# Patient Record
Sex: FEMALE | Race: OTHER | Employment: UNEMPLOYED | URBAN - METROPOLITAN AREA
[De-identification: names, ages, dates, MRNs, and addresses within clinical notes are randomized per-mention and may not be internally consistent; named-entity substitution may affect disease eponyms.]

---

## 2018-10-04 PROBLEM — N94.6 DYSMENORRHEA: Status: ACTIVE | Noted: 2018-10-04

## 2018-10-04 PROBLEM — R39.89 BLADDER PAIN: Status: ACTIVE | Noted: 2018-10-04

## 2018-10-04 PROBLEM — N39.490 OVERFLOW STRESS URINARY INCONTINENCE IN FEMALE: Status: ACTIVE | Noted: 2018-10-04

## 2018-10-04 PROBLEM — N39.3 OVERFLOW STRESS URINARY INCONTINENCE IN FEMALE: Status: ACTIVE | Noted: 2018-10-04

## 2018-10-04 PROBLEM — N92.1 MENORRHAGIA WITH IRREGULAR CYCLE: Status: ACTIVE | Noted: 2018-10-04

## 2018-10-04 PROBLEM — N94.10 DYSPAREUNIA IN FEMALE: Status: ACTIVE | Noted: 2018-10-04

## 2019-02-25 PROBLEM — N94.89 HIGH-TONE PELVIC FLOOR DYSFUNCTION: Status: ACTIVE | Noted: 2018-10-04

## 2019-02-25 PROBLEM — R35.0 URINARY FREQUENCY: Status: ACTIVE | Noted: 2019-02-25

## 2019-03-25 ENCOUNTER — HOSPITAL ENCOUNTER (OUTPATIENT)
Dept: PHYSICAL THERAPY | Age: 34
Discharge: HOME | End: 2019-03-25
Attending: OBSTETRICS & GYNECOLOGY
Payer: COMMERCIAL

## 2019-03-25 DIAGNOSIS — R35.0 URINARY FREQUENCY: ICD-10-CM

## 2019-03-25 DIAGNOSIS — R39.89 BLADDER PAIN: ICD-10-CM

## 2019-03-25 DIAGNOSIS — N94.89 HIGH-TONE PELVIC FLOOR DYSFUNCTION: ICD-10-CM

## 2019-03-25 PROCEDURE — 97161 PT EVAL LOW COMPLEX 20 MIN: CPT

## 2019-03-25 PROCEDURE — 97110 THERAPEUTIC EXERCISES: CPT

## 2019-03-25 NOTE — PROGRESS NOTES
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 Required minimal visual, verbal and manual cues to promote proper body alignment, promote proper body posture and promote proper body mechanics. Progressed resistance, range and repetitions as indicated. Date:  3-25-19 Date:   Date:     Activity/Exercise Parameters Parameters Parameters   Patient Education Discussed HEP (bladder health, drops, diaphragmatic breathing) and POC                                            Pt gives verbal consent to internal  assessment/treatment no chaperon present. [employer] Portal     Treatment/Session Summary:  Pt reports good understanding of plan of care, as well as prescribed home exercise program.  All questions were answered to pt's satisfaction. Pt was invited to call with any further questions or concerns. · Response to Treatment:  see evaluation. · Communication/Consultation:  None today  · Equipment provided today:  None today  · Recommendations/Intent for next treatment session: Next visit will focus on relaxation, manual therapy.   Treatment Plan of Care Effective Dates:  3-25-19 to 19  Total Treatment Billable Duration:  10 minutes     Salvatore Prince DPT    Future Appointments   Date Time Provider Jasen Christelle   3/25/2019  1:00 PM MAGDALENA Levin [facility]   4/8/2019  8:30 AM MAGDALENA Levin [facility]   4/15/2019  8:30 AM MAGDALENA Levin [facility]   6/5/2019  3:30 PM MARILEE Moeller SSA PGU50 PGU

## 2019-03-25 NOTE — THERAPY EVALUATION
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 5 = High Risk)  MD Orders: Evaluate and Treat MEDICAL/REFERRING DIAGNOSIS:  Urinary frequency [R35.0]  Bladder pain [R39.89]  High-tone pelvic floor dysfunction [N94.89]   DATE OF ONSET: 2-3 years  REFERRING PHYSICIAN: Aryan Prater DO  RETURN PHYSICIAN APPOINTMENT: -     INITIAL ASSESSMENT:  Ms. Steffany Irving presents to physical therapy with painful intercourse, urinary incontinence, and pressure in her bladder. Patient demonstrates increased tone and tenderness to pelvic floor, decreased pelvic floor muscular ROM and strength, decreased coordination of pelvic floor. Patient also has poor bladder habits that are contributing to her symptoms. Patient wouldn't benefit from skilled physical therapy to address her deficits and maximize her function. PROBLEM LIST (Impacting functional limitations):  1. Decreased Strength  2. Decreased Balance  3. Decreased Flexibility/Joint Mobility  4. Decreased Murray with Home Exercise Program  5. Decreased coordination INTERVENTIONS PLANNED:  1. Family Education  2. Home Exercise Program (HEP)  3. Manual Therapy  4. Neuromuscular Re-education/Strengthening  5. Range of Motion (ROM)  6. Therapeutic Activites  7. Therapeutic Exercise/Strengthening   TREATMENT PLAN:  Effective Dates: 3/25/2019 TO 2019 (90 days).   Frequency/Duration: 1 time a week for 90 Days  GOALS: (Goals have been discussed and agreed upon with patient.)  1. Patient will verbalize an understanding of pelvic anatomy and causes of incontinence   2. Patient will demonstrate I with basic PFM HEP to improve awareness, coordination, and timing of PFM   3. Patient will demonstrate decreased accessory muscle activity during PFM contraction  4. Pt with demonstrate normal voluntary relaxation of the pelvic floor muscle group to improve pelvic floor ROM and decrease pain. 5. Pt will independently perform proper toilet position to improve bowel emptying and decrease pain with bowel movements. 6. Pt will decrease pad usage to 1/day  7. Pt will increase fluid intake by 2 glasses per day          Outcome Measure:   Pelvic Floor Impact Questionnaire (PFIQ-7)  Score (out of 300) Initial:   Bladder/urinary: 28.57  Bowel/rectum: 0  Vagina/pelvis: 28.57  Total: 51 Most Recent:      Interpretation of Score: This survey asks questions concerning certain bowel, bladder, or pelvic symptoms and how much these symptoms interfere with daily activities. Each section is scored on a 0-3 scale, 3 representing the greatest disability. The scores of each section (out of 100) are added together for a total score out of 300. Medical Necessity:   · Patient demonstrates good rehab potential due to higher previous functional level. Reason for Services/Other Comments:  · Patient continues to require skilled intervention due to urinary leakage and painful intercourse. Total Treatment Duration:   PT Patient Time In/Time Out  Time In: 1300  Time Out: 151 [facility], [facility]    Rehabilitation Potential For Stated Goals: Good  Regarding Lula Amsler therapy, I certify that the treatment plan above will be carried out by a therapist or under their direction.   Thank you for this referral,  Diego Feliz DPT     Referring Physician Signature: Johnathan Scales DO              Date                  The information in this section was collected on 3-25-19 (except where otherwise noted). HISTORY:   History of Present Injury/Illness (Reason for Referral):  Patient reports she is supposed to do therapy before getting a hysterectomy. She has painful periods and very delayed periods. She got her tubes tied after her last child. Sex has always been painful. Has had 3 vaginal deliveries. Bladder does not empty. She bloats really bad. Has a history of kidney stones. Leakage with coughing, sneezing. Feels like when she is about to climax she will pee on herself. Has a cyst on her uterus. Has been to OB-GYN, urologist, and URO-GYN. Reports when she sits on the toilet she feels like there is a ball of meat. Mom has a history of cervical cancer. Is unable to wear tampons b/c it pushes itself out. Urinary: Frequency 3-4 x/day, 2 x/night. Positive for  mixed urinary incontinence (SHON), urgency, incomplete emptying. Pt uses not use pads for protects; 2 pads per day (PPD). Denies  mixed urinary incontinence (SHON), frequency,  urinary hesitancy, dysuria, hematuria. Fluid intake: 0 oz water/day; bladder irritants include: mountain dew, tea, coffee  Bowel: Frequency 1 every day. Denies pain with bowel movement (BM), pushing/straining with BM, incomplete emptying, fecal incontinence, constipation. Sexual: Pt is sexually active. Male partners. Positive for dyspareunia. .  Pelvic Organ Prolapse/Pelvic Pain: none. Past Medical History/Comorbidities:   Ms. Goran Watson  has a past medical history of Anxiety, Depression, Depression, Kidney stone, Neuropathy, Other ill-defined conditions(799.89), Restless leg syndrome, and Restless leg syndrome.  She also has no past medical history of Arthritis, Asthma, Autoimmune disease ([address] CAD (coronary artery disease), Cancer ([address] Chronic kidney disease, COPD, Dementia, Diabetes ([address] Endocrine disease, Gastrointestinal disorder, Heart failure ([address] Hypertension, Infectious disease, Liver disease, Neurological disorder, PUD (peptic ulcer disease), Seizures [facility]), Stroke ([facility] or Thromboembolus ([facility] Ms. Ezequiel So  has a past surgical history that includes hx urological; pr lap,tubal cautery; fragment kidney stone/ eswl; and hx tubal ligation (2015). Social History/Living Environment:    Live with  and children  Have you ever had any pelvic trauma (orthopedic in nature, fall, MVA, etc.)? no  Have you ever experienced any unwanted physical or sexual contact? no  Have you ever experienced any form of medical trauma (GYN, urological, GI, etc)? no    Prior Level of Function/Work/Activity:  Not working. Does not exercise     Dominant Side:         RIGHT    Personal Factors:          Sex:  female        Age:  29 y.o. Date: 3-25-19   Ambulatory/Rehab Services H2 Model Falls Risk Assessment    Risk Factors:       No Risk Factors Identified Ability to Rise from Chair:       (0)  Ability to rise in a single movement    Falls Prevention Plan:       No modifications necessary   Total: (5 or greater = High Risk): 0    ©2010 [facility] [employer]. All Rights Reserved. [facility] Patent #[id number]. Federal Law prohibits the replication, distribution or use without written permission from [facility] [employer]     Current Medications:       Current Outpatient Medications:     venlafaxine-SR (EFFEXOR-XR) 37.5 mg capsule, Take 1 Cap by mouth daily. , Disp: 30 Cap, Rfl: 0    trazodone HCl (TRAZODONE PO), Take  by mouth., Disp: , Rfl:     clonazePAM (KLONOPIN) 0.5 mg tablet, Take  by mouth nightly as needed. , Disp: , Rfl:     divalproex DR (DEPAKOTE) 500 mg tablet, Take  by mouth three (3) times daily. , Disp: , Rfl:     gabapentin (NEURONTIN) 300 mg capsule, Take 300 mg by mouth three (3) times daily. , Disp: , Rfl:     cyclobenzaprine (FLEXERIL) 10 mg tablet, Take  by mouth three (3) times daily as needed for Muscle Spasm(s). , Disp: , Rfl:    Date Last Reviewed:  3/25/2019   Number of Personal Factors/Comorbidities that affect the Plan of Care: 0: LOW COMPLEXITY EXAMINATION:     Observation/Orthostatic Postural Assessment:          Normal external genitalia. Decreased ability to wink and bulge. Increased accessory muscle use  Palpation:          Increased tightness and tone to pelvic floor. Painful  ROM:          Decreased due to high tone  Strength:          P: Power, E: Endurance, R: Repetitions, QF: Quick Flicks, TrA: Transverse Abdominus, DB: Diaphragmatic Breathing  P 2   E 1   R 1   QF    TrA    DB      Special Tests:          Prolapse: anterior wall stage 1   Neurological Screen:        Myotomes:  intact        Dermatomes:  intact     Body Structures Involved:  1. Muscles Body Functions Affected:  1. Sensory/Pain  2. Neuromusculoskeletal  3. Movement Related Activities and Participation Affected:  1. General Tasks and Demands  2. Self Care  3. Domestic Life  4. Interpersonal Interactions and Relationships  5.  Community, Social and Wayne Ruth   Number of elements (examined above) that affect the Plan of Care: 3: MODERATE COMPLEXITY   CLINICAL PRESENTATION:   Presentation: Evolving clinical presentation with changing clinical characteristics: MODERATE COMPLEXITY   CLINICAL DECISION MAKING:      Use of outcome tool(s) and clinical judgement create a POC that gives a: Questionable prediction of patient's progress: MODERATE COMPLEXITY

## 2019-04-07 NOTE — PROGRESS NOTES
Shanice Guevara  : 1985  Primary: 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 She noticed when she started the exercises she got stabbing pains in the ovaries. Continues to get urinary leakage with sneezing. Has added in 1 bottle of water. Pain: Initial:   0 Post Session:  0/10   Medications Last Reviewed:  2019   Updated Objective Findings:  see below   Observation/Orthostatic Postural Assessment:          Normal external genitalia. Decreased ability to wink and bulge. Increased accessory muscle use  Palpation:          Increased tightness and tone to pelvic floor. Painful  ROM:          Decreased due to high tone  Strength:          P: Power, E: Endurance, R: Repetitions, QF: Quick Flicks, TrA: Transverse Abdominus, DB: Diaphragmatic Breathing  P 2   E 1   R 1   QF     TrA     DB            TREATMENT:   THERAPEUTIC EXERCISE: (15  minutes):  Exercises per grid below to improve mobility, strength and coordination. Required minimal visual, verbal and manual cues to promote proper body alignment, promote proper body posture and promote proper body mechanics. Progressed resistance, range and repetitions as indicated.    Date:  3-25-19 Date:  19 Date:     Activity/Exercise Parameters Parameters Parameters   Patient Education Discussed HEP (bladder health, drops, diaphragmatic breathing) and POC     SKTC    30 sec hold x 3    Butterfly     30 sec hold x 3    Figure 4    30 sec hold x 3    Toilet Mechanics        The Knack                 Pt gives verbal consent to internal  assessment/treatment no chaperon present. MANUAL THERAPY: (45 minutes) to improve soft tissue mobility and tone  Date Type Location Comments   4/8/2019 Internal assessment/treatment Via vaginal canal Sustained pressure, connective tissue mobilizations and trigger point release to all layers                                          (Used abbreviations: MET - muscle energy technique; SCS- Strain counter strain; CTM-Connective tissue mobilizations; CR- Contract/relax; SP- Sustained pressure, TrP-Trigger point release, IASTM- Instrument assisted soft tissue mobilizations, TDN-Trigger point dry needling)    [facility] Portal     Treatment/Session Summary:  Pt reports good understanding of plan of care, as well as prescribed home exercise program.  All questions were answered to pt's satisfaction. Pt was invited to call with any further questions or concerns. · Response to Treatment:  Patient continues to have increased tone in pelvic floor but less painful today. Has not been as compliant with her HEP. Continues to have poor diet. · Communication/Consultation:  None today  · Equipment provided today:  None today  · Recommendations/Intent for next treatment session: Next visit will focus on dilators, manual therapy.   Treatment Plan of Care Effective Dates:  3-25-19 to 6-25-19  Total Treatment Billable Duration:  45 minutes manual, 10 minutes there ex     Juan José Clayton DPT    Future Appointments   Date Time Provider Jasen Bourgeois   4/8/2019  8:30 AM Clay Barrier, DPT [facility]   4/15/2019  8:30 AM Marcheta Barrier, DPT [facility]   4/22/2019  8:30 AM Marcheta Barrier, DPT [facility]   4/29/2019  8:30 AM Marcheta Barrier, DPT [facility]   5/6/2019  9:00 AM Marcheta Barrier, DPT [facility]   5/13/2019  9:00 AM Marcheta Barrier, DPT [facility]   6/5/2019  3:30 PM MARILEE Ortiz SSA PGU50 PGU

## 2019-04-08 ENCOUNTER — HOSPITAL ENCOUNTER (OUTPATIENT)
Dept: PHYSICAL THERAPY | Age: 34
Discharge: HOME | End: 2019-04-08
Attending: OBSTETRICS & GYNECOLOGY
Payer: COMMERCIAL

## 2019-04-08 PROCEDURE — 97140 MANUAL THERAPY 1/> REGIONS: CPT

## 2019-04-08 PROCEDURE — 97110 THERAPEUTIC EXERCISES: CPT

## 2019-04-11 NOTE — PROGRESS NOTES
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 Her ovaries \"kept going crazy. \" She is 1 week late for her period. Does not have as much leakage. Pain: Initial:   0 Post Session:  0/10   Medications Last Reviewed:  4/15/2019   Updated Objective Findings:  see below   Observation/Orthostatic Postural Assessment:          Normal external genitalia. Decreased ability to wink and bulge. Increased accessory muscle use  Palpation:          Increased tightness and tone to pelvic floor. Painful  ROM:          Decreased due to high tone  Strength:          P: Power, E: Endurance, R: Repetitions, QF: Quick Flicks, TrA: Transverse Abdominus, DB: Diaphragmatic Breathing  P 2   E 1   R 1   QF     TrA     DB            TREATMENT:   THERAPEUTIC EXERCISE: (15  minutes):  Exercises per grid below to improve mobility, strength and coordination. Required minimal visual, verbal and manual cues to promote proper body alignment, promote proper body posture and promote proper body mechanics. Progressed resistance, range and repetitions as indicated.    Date:  3-25-19 Date:  19 Date:  4-15-19   Activity/Exercise Parameters Parameters Parameters   Patient Education Discussed HEP (bladder health, drops, diaphragmatic breathing) and POC     SKTC    30 sec hold x 3 30 sec hold x 3   Butterfly     30 sec hold x 3 30 sec hold x 3   Figure 4    30 sec hold x 3 30 sec hold x 3   Toilet Mechanics The Alvaro     Reviewed and discussed             Pt gives verbal consent to internal  assessment/treatment no chaperon present. MANUAL THERAPY: (45 minutes) to improve soft tissue mobility and tone  Date Type Location Comments   4/15/2019 Internal assessment/treatment Via vaginal canal Sustained pressure, connective tissue mobilizations and trigger point release to all layers                                          (Used abbreviations: MET - muscle energy technique; SCS- Strain counter strain; CTM-Connective tissue mobilizations; CR- Contract/relax; SP- Sustained pressure, TrP-Trigger point release, IASTM- Instrument assisted soft tissue mobilizations, TDN-Trigger point dry needling)    [employer] Portal     Treatment/Session Summary:  Pt reports good understanding of plan of care, as well as prescribed home exercise program.  All questions were answered to pt's satisfaction. Pt was invited to call with any further questions or concerns. · Response to Treatment:  Patient with slightly less tone in muscles today. Did not report pain. Continues to follow poor diet. Reviewed the alvaro. · Communication/Consultation:  None today  · Equipment provided today:  None today  · Recommendations/Intent for next treatment session: Next visit will focus on dilators, manual therapy.   Treatment Plan of Care Effective Dates:  3-25-19 to 6-25-19  Total Treatment Billable Duration:  45 minutes manual, 10 minutes there ex  PT Patient Time In/Time Out  Time In: 0830  Time Out: [address], DPT    Future Appointments   Date Time Provider Jasen Bourgeois   4/22/2019  8:30 AM MAGDALENA Tse [facility]   4/29/2019  8:30 AM MAGDALENA Tse [facility]   5/6/2019  9:00 AM MAG[facility]ALENA [facility]   5/13/2019  9:00 AM Clay Ramos DPT [facility]   6/5/2019  3:30 PM MARILEE Ortiz SSA PGU50 PGU

## 2019-04-15 ENCOUNTER — HOSPITAL ENCOUNTER (OUTPATIENT)
Dept: PHYSICAL THERAPY | Age: 34
Discharge: HOME | End: 2019-04-15
Attending: OBSTETRICS & GYNECOLOGY
Payer: COMMERCIAL

## 2019-04-15 PROCEDURE — 97110 THERAPEUTIC EXERCISES: CPT

## 2019-04-15 PROCEDURE — 97140 MANUAL THERAPY 1/> REGIONS: CPT

## 2019-04-29 ENCOUNTER — HOSPITAL ENCOUNTER (OUTPATIENT)
Dept: PHYSICAL THERAPY | Age: 34
Discharge: HOME | End: 2019-04-29
Attending: OBSTETRICS & GYNECOLOGY
Payer: COMMERCIAL

## 2019-04-29 PROCEDURE — 97140 MANUAL THERAPY 1/> REGIONS: CPT

## 2019-04-29 PROCEDURE — 97110 THERAPEUTIC EXERCISES: CPT

## 2019-04-29 NOTE — PROGRESS NOTES
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 Reports she finally got her period. Had intercourse last night and it wasn't painful. Leakage getting better. Still has urgency. Pain: Initial:   0 Post Session:  0/10   Medications Last Reviewed:  2019   Updated Objective Findings:  see below   Observation/Orthostatic Postural Assessment:          Normal external genitalia. Decreased ability to wink and bulge. Increased accessory muscle use  Palpation:          Increased tightness and tone to pelvic floor. Painful  ROM:          Decreased due to high tone  Strength:          P: Power, E: Endurance, R: Repetitions, QF: Quick Flicks, TrA: Transverse Abdominus, DB: Diaphragmatic Breathing  P 2   E 1   R 1   QF     TrA     DB         Urinary: Frequency 3-4 x/day, 2 x/night. Positive for  mixed urinary incontinence (SHON), urgency, incomplete emptying. Pt uses not use pads for protects; 2 pads per day (PPD). Denies  mixed urinary incontinence (SHON), frequency,  urinary hesitancy, dysuria, hematuria. Fluid intake: 0 oz water/day; bladder irritants include: mountain dew, tea, coffee  Bowel: Frequency 1 every day. Denies pain with bowel movement (BM), pushing/straining with BM, incomplete emptying, fecal incontinence, constipation. Sexual: Pt is sexually active. Male partners. Positive for dyspareunia. .  Pelvic Organ Prolapse/Pelvic Pain: none.      TREATMENT: THERAPEUTIC EXERCISE: (15  minutes):  Exercises per grid below to improve mobility, strength and coordination. Required minimal visual, verbal and manual cues to promote proper body alignment, promote proper body posture and promote proper body mechanics. Progressed resistance, range and repetitions as indicated. Date:  3-25-19 Date:  4-8-19 Date:  4-15-19 Date:  4-29-19   Activity/Exercise Parameters Parameters Parameters    Patient Education Discussed HEP (bladder health, drops, diaphragmatic breathing) and POC      SKTC    30 sec hold x 3 30 sec hold x 3 30 sec hold x 3   Butterfly     30 sec hold x 3 30 sec hold x 3 30 sec hold x 3   Figure 4    30 sec hold x 3 30 sec hold x 3 30 sec hold x 3   Toilet Mechanics         The Knack     Reviewed and discussed  Urge suppression             Pt gives verbal consent to internal  assessment/treatment no chaperon present. MANUAL THERAPY: (45 minutes) to improve soft tissue mobility and tone  Date Type Location Comments   4/29/2019 Internal assessment/treatment Via vaginal canal Sustained pressure, connective tissue mobilizations and trigger point release to all layers                                          (Used abbreviations: MET - muscle energy technique; SCS- Strain counter strain; CTM-Connective tissue mobilizations; CR- Contract/relax; SP- Sustained pressure, TrP-Trigger point release, IASTM- Instrument assisted soft tissue mobilizations, TDN-Trigger point dry needling)    [facility] Portal     Treatment/Session Summary:  Pt reports good understanding of plan of care, as well as prescribed home exercise program.  All questions were answered to pt's satisfaction. Pt was invited to call with any further questions or concerns. · Response to Treatment:  Patient has demonstrated improvements in symptoms. Will continue to progress as tolerated.      · Communication/Consultation:  None today  · Equipment provided today:  None today  · Recommendations/Intent for next treatment session: Next visit will focus on manual therapy, exercises  Treatment Plan of Care Effective Dates:  3-25-19 to 6-25-19  Total Treatment Billable Duration:  45 minutes manual, 10 minutes there ex  PT Patient Time In/Time Out  Time In: 0830  Time Out: [address], DPT    Future Appointments   Date Time Provider Jasen Bourgeois   5/6/2019  9:00 AM MAGDALENA Tse [facility]   5/13/2019  9:00 AM MAGDALENA Tse [facility]   6/5/2019  3:30 PM MARILEE Ortiz SSA PGU50 PGU

## 2019-05-02 NOTE — PROGRESS NOTES
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 She had sex 3 days in a row and had tightening from her ribs down to her pelvis. It doesn't hurt during sex but it hurts after. Patient continues to have urgency. Pain: Initial:   0 Post Session:  0/10   Medications Last Reviewed:  2019   Updated Objective Findings:  see below   Observation/Orthostatic Postural Assessment:          Normal external genitalia. Decreased ability to wink and bulge. Increased accessory muscle use  Palpation:          Increased tightness and tone to pelvic floor. Painful  ROM:          Decreased due to high tone  Strength:          P: Power, E: Endurance, R: Repetitions, QF: Quick Flicks, TrA: Transverse Abdominus, DB: Diaphragmatic Breathing  P 2   E 1   R 1   QF     TrA     DB         Urinary: Frequency 3-4 x/day, 2 x/night. Positive for  mixed urinary incontinence (SHON), urgency, incomplete emptying. Pt uses not use pads for protects; 2 pads per day (PPD). Denies  mixed urinary incontinence (SHON), frequency,  urinary hesitancy, dysuria, hematuria. Fluid intake: 0 oz water/day; bladder irritants include: mountain dew, tea, coffee  Bowel: Frequency 1 every day. Denies pain with bowel movement (BM), pushing/straining with BM, incomplete emptying, fecal incontinence, constipation. Sexual: Pt is sexually active. Male partners. Positive for dyspareunia. .  Pelvic Organ Prolapse/Pelvic Pain: none.      TREATMENT: THERAPEUTIC EXERCISE: (30  minutes):  Exercises per grid below to improve mobility, strength and coordination. Required minimal visual, verbal and manual cues to promote proper body alignment, promote proper body posture and promote proper body mechanics. Progressed resistance, range and repetitions as indicated. Date:  3-25-19 Date:  4-8-19 Date:  4-15-19 Date:  4-29-19 Date:  5-6-19   Activity/Exercise Parameters Parameters Parameters     Patient Education Discussed HEP (bladder health, drops, diaphragmatic breathing) and POC       SKTC    30 sec hold x 3 30 sec hold x 3 30 sec hold x 3 30 sec hold x 3   Butterfly     30 sec hold x 3 30 sec hold x 3 30 sec hold x 3 30 sec hold x 3   Figure 4    30 sec hold x 3 30 sec hold x 3 30 sec hold x 3 30 sec hold x 3   Toilet Mechanics          The Knack     Reviewed and discussed  Urge suppression    Bridge     x15     Clamshells     x15     Quadruped with leg extension     x15 B        Pt gives verbal consent to internal  assessment/treatment no chaperon present. MANUAL THERAPY: (30 minutes) to improve soft tissue mobility and tone  Date Type Location Comments   5/6/2019 Internal assessment/treatment Via vaginal canal Sustained pressure, connective tissue mobilizations and trigger point release to all layers                                          (Used abbreviations: MET - muscle energy technique; SCS- Strain counter strain; CTM-Connective tissue mobilizations; CR- Contract/relax; SP- Sustained pressure, TrP-Trigger point release, IASTM- Instrument assisted soft tissue mobilizations, TDN-Trigger point dry needling)    [facility] Portal     Treatment/Session Summary:  Pt reports good understanding of plan of care, as well as prescribed home exercise program.  All questions were answered to pt's satisfaction. Pt was invited to call with any further questions or concerns. · Response to Treatment:  Patient with less tightness in pelvic floor.  Will see how there ex was tolerated.       · Communication/Consultation:  None today  · Equipment provided today:  None today  · Recommendations/Intent for next treatment session: Next visit will focus on manual therapy, exercises  Treatment Plan of Care Effective Dates:  3-25-19 to 6-25-19  Total Treatment Billable Duration:  30 minutes manual, 25 minutes there ex  PT Patient Time In/Time Out  Time In: 0900  Time Out: Wilma Lorenzana 42, DPT    Future Appointments   Date Time Provider Jasen Bourgeois   5/13/2019  9:00 AM MAGDALENA De La [facility]   5/28/2019  9:00 AM MAGDALENA De La Rosa [facility]   6/4/2019  9:00 AM MAGDALENA De La Rosa [facility]   6/5/2019  3:30 PM MARILEE Andrews SSA [id number] PGU

## 2019-05-06 ENCOUNTER — HOSPITAL ENCOUNTER (OUTPATIENT)
Dept: PHYSICAL THERAPY | Age: 34
Discharge: HOME | End: 2019-05-06
Attending: OBSTETRICS & GYNECOLOGY
Payer: COMMERCIAL

## 2019-05-06 PROCEDURE — 97110 THERAPEUTIC EXERCISES: CPT

## 2019-05-06 PROCEDURE — 97140 MANUAL THERAPY 1/> REGIONS: CPT

## 2019-05-13 ENCOUNTER — HOSPITAL ENCOUNTER (OUTPATIENT)
Dept: PHYSICAL THERAPY | Age: 34
Discharge: HOME | End: 2019-05-13
Attending: OBSTETRICS & GYNECOLOGY
Payer: COMMERCIAL

## 2019-05-13 PROCEDURE — 97110 THERAPEUTIC EXERCISES: CPT

## 2019-05-13 PROCEDURE — 97140 MANUAL THERAPY 1/> REGIONS: CPT

## 2019-05-13 NOTE — PROGRESS NOTES
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 5 = High Risk)  MD Orders: Evaluate and Treat MEDICAL/REFERRING DIAGNOSIS:  Urinary frequency [R35.0]  Bladder pain [R39.89]  High-tone pelvic floor dysfunction [N94.89]   DATE OF ONSET: 2-3 years  REFERRING PHYSICIAN: Ania Topete DO RETURN PHYSICIAN APPOINTMENT: -         Pre-treatment Symptoms/Complaints:  Patient reports she tried to have sex the next day after therapy and it hurt and didn't work. Patient reports the exercises have increased her restless legs and she hasn't been sleeping at night. Reports that she thought things were getting better but its worse. Reports she is nervous for the urodynamics test.    Pain: Initial:   0 Post Session:  0/10   Medications Last Reviewed:  2019   Updated Objective Findings:  see below   Observation/Orthostatic Postural Assessment:          Normal external genitalia. Decreased ability to wink and bulge. Increased accessory muscle use  Palpation:          Increased tightness and tone to pelvic floor. Painful  ROM:          Decreased due to high tone  Strength:          P: Power, E: Endurance, R: Repetitions, QF: Quick Flicks, TrA: Transverse Abdominus, DB: Diaphragmatic Breathing  P 2   E 1   R 1   QF     TrA     DB         Urinary: Frequency 3-4 x/day, 2 x/night. Positive for  mixed urinary incontinence (SHON), urgency, incomplete emptying. Pt uses not use pads for protects; 2 pads per day (PPD).  Denies mixed urinary incontinence (SHON), frequency,  urinary hesitancy, dysuria, hematuria. Fluid intake: 0 oz water/day; bladder irritants include: mountain dew, tea, coffee  Bowel: Frequency 1 every day. Denies pain with bowel movement (BM), pushing/straining with BM, incomplete emptying, fecal incontinence, constipation. Sexual: Pt is sexually active. Male partners. Positive for dyspareunia. .  Pelvic Organ Prolapse/Pelvic Pain: none. TREATMENT:   THERAPEUTIC EXERCISE: (15  minutes):  Exercises per grid below to improve mobility, strength and coordination. Required minimal visual, verbal and manual cues to promote proper body alignment, promote proper body posture and promote proper body mechanics. Progressed resistance, range and repetitions as indicated. Date:  3-25-19 Date:  4-8-19 Date:  4-15-19 Date:  4-29-19 Date:  5-6-19 Date:  5-13-19   Activity/Exercise Parameters Parameters Parameters      Patient Education Discussed HEP (bladder health, drops, diaphragmatic breathing) and POC        SKTC    30 sec hold x 3 30 sec hold x 3 30 sec hold x 3 30 sec hold x 3 30 sec hold x 3   Butterfly     30 sec hold x 3 30 sec hold x 3 30 sec hold x 3 30 sec hold x 3 30 sec hold x 3   Figure 4    30 sec hold x 3 30 sec hold x 3 30 sec hold x 3 30 sec hold x 3 30 sec hold x 3   Toilet Mechanics           The Cynthia     Reviewed and discussed  Urge suppression     Bridge     x15      Clamshells     x15      Quadruped with leg extension     x15 B         Pt gives verbal consent to internal  assessment/treatment no chaperon present.      MANUAL THERAPY: (45 minutes) to improve soft tissue mobility and tone  Date Type Location Comments   5/13/2019 Internal assessment/treatment Via vaginal canal Sustained pressure, connective tissue mobilizations and trigger point release to all layers                                          (Used abbreviations: MET - muscle energy technique; SCS- Strain counter strain; CTM-Connective tissue mobilizations; CR- Contract/relax; SP- Sustained pressure, TrP-Trigger point release, IASTM- Instrument assisted soft tissue mobilizations, TDN-Trigger point dry needling)    [facility] Portal     Treatment/Session Summary:  Pt reports good understanding of plan of care, as well as prescribed home exercise program.  All questions were answered to pt's satisfaction. Pt was invited to call with any further questions or concerns. · Response to Treatment:  Patient's pelvic floor was like hitting a brick wall. It was super tight and it felt like her rectum was in the way. Post manual she was able to empty her bladder completely.      · Communication/Consultation:  None today  · Equipment provided today:  None today  · Recommendations/Intent for next treatment session: Next visit will focus on manual therapy, exercises  Treatment Plan of Care Effective Dates:  3-25-19 to 6-25-19  Total Treatment Billable Duration:  40 minutes manual, 15 minutes there ex  PT Patient Time In/Time Out  Time In: 0900  Time Out: Wilma Mcdaniels, MAGDALENA    Future Appointments   Date Time Provider Jasen Bourgeois   5/28/2019  9:00 AM MAGDALENA Fox [facility]   [facility]2019  [facility]   6/12/2019  3:30 PM MARILEE Hauser SSA [id number] PGU

## 2019-05-28 ENCOUNTER — HOSPITAL ENCOUNTER (OUTPATIENT)
Dept: PHYSICAL THERAPY | Age: 34
Discharge: HOME | End: 2019-05-28
Attending: OBSTETRICS & GYNECOLOGY
Payer: COMMERCIAL

## 2019-05-28 PROCEDURE — 97110 THERAPEUTIC EXERCISES: CPT

## 2019-05-28 PROCEDURE — 97140 MANUAL THERAPY 1/> REGIONS: CPT

## 2019-05-28 NOTE — PROGRESS NOTES
Keya Escalera  : 1985  Primary: [facility]  Secondary:  [facility] at [address] [place], [place] 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 5 = High Risk)  MD Orders: Evaluate and Treat MEDICAL/REFERRING DIAGNOSIS:  Urinary frequency [R35.0]  Bladder pain [R39.89]  High-tone pelvic floor dysfunction [N94.89]   DATE OF ONSET: 2-3 years  REFERRING PHYSICIAN: Tari Topete DO RETURN PHYSICIAN APPOINTMENT: -         Pre-treatment Symptoms/Complaints:  Patient reports she had sex and she bled for 3 days. She took a picture of her vagina and the one day it showed a bulge the other day it wasn't there. Reports she was swollen down there the next day. Reports she doesn't have the urge to urinate as much which is better. Pain: Initial:   0 Post Session:  0/10   Medications Last Reviewed:  2019   Updated Objective Findings:  see below   Observation/Orthostatic Postural Assessment:          Normal external genitalia. Decreased ability to wink and bulge. Increased accessory muscle use  Palpation:          Increased tightness and tone to pelvic floor. Painful  ROM:          Decreased due to high tone  Strength:          P: Power, E: Endurance, R: Repetitions, QF: Quick Flicks, TrA: Transverse Abdominus, DB: Diaphragmatic Breathing  P 2   E 1   R 1   QF     TrA     DB         Urinary: Frequency 3-4 x/day, 2 x/night. Positive for  mixed urinary incontinence (SHON), urgency, incomplete emptying. Pt uses not use pads for protects; 2 pads per day (PPD).  Denies  mixed urinary incontinence (SHON), frequency, urinary hesitancy, dysuria, hematuria. Fluid intake: 0 oz water/day; bladder irritants include: mountain dew, tea, coffee  Bowel: Frequency 1 every day. Denies pain with bowel movement (BM), pushing/straining with BM, incomplete emptying, fecal incontinence, constipation. Sexual: Pt is sexually active. Male partners. Positive for dyspareunia. .  Pelvic Organ Prolapse/Pelvic Pain: none. TREATMENT:   THERAPEUTIC EXERCISE: (15  minutes):  Exercises per grid below to improve mobility, strength and coordination. Required minimal visual, verbal and manual cues to promote proper body alignment, promote proper body posture and promote proper body mechanics. Progressed resistance, range and repetitions as indicated. Date:  3-25-19 Date:  4-8-19 Date:  4-15-19 Date:  4-29-19 Date:  5-6-19 Date:  5-13-19 Date:  5-28-19   Activity/Exercise Parameters Parameters Parameters       Patient Education Discussed HEP (bladder health, drops, diaphragmatic breathing) and POC         SKTC    30 sec hold x 3 30 sec hold x 3 30 sec hold x 3 30 sec hold x 3 30 sec hold x 3 30 sec hold x 3   Butterfly     30 sec hold x 3 30 sec hold x 3 30 sec hold x 3 30 sec hold x 3 30 sec hold x 3 30 sec hold x 3   Figure 4    30 sec hold x 3 30 sec hold x 3 30 sec hold x 3 30 sec hold x 3 30 sec hold x 3 30 sec hold x 3   Toilet Mechanics            The Knack     Reviewed and discussed  Urge suppression      Bridge     x15    x15   Clamshells     x15    x15   Quadruped with leg extension     x15 B    x15 B      Pt gives verbal consent to internal  assessment/treatment no chaperon present.      MANUAL THERAPY: (45 minutes) to improve soft tissue mobility and tone  Date Type Location Comments   5/28/2019 Internal assessment/treatment Via vaginal canal Sustained pressure, connective tissue mobilizations and trigger point release to all layers                                          (Used abbreviations: MET - muscle energy technique; SCS- Strain counter strain; CTM-Connective tissue mobilizations; CR- Contract/relax; SP- Sustained pressure, TrP-Trigger point release, IASTM- Instrument assisted soft tissue mobilizations, TDN-Trigger point dry needling)    [facility] Portal     Treatment/Session Summary:  Pt reports good understanding of plan of care, as well as prescribed home exercise program.  All questions were answered to pt's satisfaction. Pt was invited to call with any further questions or concerns. · Response to Treatment:  Patient showed me pictures of the prolapse at different times of the day. Today pelvic floor was still tight but not tender. Started back with exercises to help prevent prolapse.    · Communication/Consultation:  None today  · Equipment provided today:  None today  · Recommendations/Intent for next treatment session: Next visit will focus on manual therapy, exercises  Treatment Plan of Care Effective Dates:  3-25-19 to 6-25-19  Total Treatment Billable Duration:  40 minutes manual, 10 minutes there ex  PT Patient Time In/Time Out  Time In: 0900  Time Out: Wilma Mcdaniels, MAGDALENA    Future Appointments   Date Time Provider Jasen Bourgeois   6/4/2019  9:00 AM Merna Castañeda DPT [facility]   6/12/2019  3:30 PM MARILEE Delgado SSA PGU50 PGU

## 2019-06-04 ENCOUNTER — HOSPITAL ENCOUNTER (OUTPATIENT)
Dept: PHYSICAL THERAPY | Age: 34
Discharge: HOME | End: 2019-06-04
Attending: OBSTETRICS & GYNECOLOGY
Payer: COMMERCIAL

## 2019-06-04 PROCEDURE — 97140 MANUAL THERAPY 1/> REGIONS: CPT

## 2019-06-04 PROCEDURE — 97110 THERAPEUTIC EXERCISES: CPT

## 2019-06-04 NOTE — PROGRESS NOTES
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 5 = High Risk)  MD Orders: Evaluate and Treat MEDICAL/REFERRING DIAGNOSIS:  Urinary frequency [R35.0]  Bladder pain [R39.89]  High-tone pelvic floor dysfunction [N94.89]   DATE OF ONSET: 2-3 years  REFERRING PHYSICIAN: Charu Topete DO RETURN PHYSICIAN APPOINTMENT: -         Pre-treatment Symptoms/Complaints:  Patient reports she is starting the bladder diary for the urodynamics test. She has not had sex. Pain: Initial:   0 Post Session:  0/10   Medications Last Reviewed:  2019   Updated Objective Findings:  see below   Observation/Orthostatic Postural Assessment:          Normal external genitalia. Palpation:          Increased tightness and tone to pelvic floor. ROM:          Decreased due to high tone  Strength:          P: Power, E: Endurance, R: Repetitions, QF: Quick Flicks, TrA: Transverse Abdominus, DB: Diaphragmatic Breathing  P 2   E 1   R 1   QF     TrA     DB         Urinary: Frequency 3-4 x/day, 1 x/night. Positive for mixed urinary incontinence (SHON), urgency. Pt uses not use pads for protects; 0 pads per day (PPD). Denies  JUSTO, frequency,  urinary hesitancy, incomplete emptying, dysuria, hematuria. Fluid intake: 0 oz water/day; bladder irritants include: mountain dew, tea, coffee  Bowel: Frequency 1 every day. Denies pain with bowel movement (BM), pushing/straining with BM, incomplete emptying, fecal incontinence, constipation. Sexual: Pt is sexually active. Male partners. Positive for dyspareunia. .  Pelvic Organ Prolapse/Pelvic Pain: none. TREATMENT:   THERAPEUTIC EXERCISE: (15  minutes):  Exercises per grid below to improve mobility, strength and coordination. Required minimal visual, verbal and manual cues to promote proper body alignment, promote proper body posture and promote proper body mechanics. Progressed resistance, range and repetitions as indicated. Date:  3-25-19 Date:  4-8-19 Date:  4-15-19 Date:  4-29-19 Date:  5-6-19 Date:  5-13-19 Date:  5-28-19 Date:  6-4-19   Activity/Exercise Parameters Parameters Parameters        Patient Education Discussed HEP (bladder health, drops, diaphragmatic breathing) and POC          SKTC    30 sec hold x 3 30 sec hold x 3 30 sec hold x 3 30 sec hold x 3 30 sec hold x 3 30 sec hold x 3 30 sec hold x 3   Butterfly     30 sec hold x 3 30 sec hold x 3 30 sec hold x 3 30 sec hold x 3 30 sec hold x 3 30 sec hold x 3 30 sec hold x 3   Figure 4    30 sec hold x 3 30 sec hold x 3 30 sec hold x 3 30 sec hold x 3 30 sec hold x 3 30 sec hold x 3 30 sec hold x 3   Toilet Mechanics             The Knack     Reviewed and discussed  Urge suppression       Bridge     x15    x15    Clamshells     x15    x15    Quadruped with leg extension     x15 B    x15 B       Pt gives verbal consent to internal  assessment/treatment no chaperon present.      MANUAL THERAPY: (30 minutes) to improve soft tissue mobility and tone  Date Type Location Comments   6/4/2019 Internal assessment/treatment Via vaginal canal Sustained pressure, connective tissue mobilizations and trigger point release to all layers                                          (Used abbreviations: MET - muscle energy technique; SCS- Strain counter strain; CTM-Connective tissue mobilizations; CR- Contract/relax; SP- Sustained pressure, TrP-Trigger point release, IASTM- Instrument assisted soft tissue mobilizations, TDN-Trigger point dry needling)    [facility] Portal     Treatment/Session Summary:  Pt reports good understanding of plan of care, as well as prescribed home exercise program.  All questions were answered to pt's satisfaction. Pt was invited to call with any further questions or concerns.   Total Treatment Billable Duration:  30 minutes manual, 15 minutes there ex  PT Patient Time In/Time Out  Time In: 0900  Time Out: Wilma Lorenzana 42, DPT    Future Appointments   Date Time Provider Jasen Bourgeois   6/12/2019  3:30 PM MARILEE Herrera [id number] PGU

## 2019-06-04 NOTE — PROGRESS NOTES
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 5 = High Risk)  MD Orders: Evaluate and Treat MEDICAL/REFERRING DIAGNOSIS:  Frequency of micturition [R35.0]  Other symptoms and signs involving the genitourinary system [R39.89]  Other specified conditions associated with female genital organs and menstrual cycle [N94.89]   DATE OF ONSET: 2-3 years  REFERRING PHYSICIAN: Tristen Barriga DO  RETURN PHYSICIAN APPOINTMENT: -   As of 2019, Lazaro Edmonds has attended 8 out of 8 scheduled visits, with 0 cancellation(s) and 1 no shows. Patient continues to have painful sex, urgency, and leakage. Patient can visibly see and feel her prolapse. She bleeds after sex. Patient's pelvic floor muscles still have high tone. Patient is scheduled for a urodynamic test with Dr. Betty Lindsey. Patient has been instructed to follow up with Dr. Christiano Hernandez as well. Patient to be discharged at this time as she is not making progress. INITIAL ASSESSMENT:  Ms. Kenzie Puentes presents to physical therapy with painful intercourse, urinary incontinence, and pressure in her bladder. Patient demonstrates increased tone and tenderness to pelvic floor, decreased pelvic floor muscular ROM and strength, decreased coordination of pelvic floor. Patient also has poor bladder habits that are contributing to her symptoms. Patient wouldn't benefit from skilled physical therapy to address her deficits and maximize her function.        GOALS: (Goals have been discussed and agreed upon with patient.)  1. Patient will verbalize an understanding of pelvic anatomy and causes of incontinence MET  2. Patient will demonstrate I with basic PFM HEP to improve awareness, coordination, and timing of PFM  MET  3. Patient will demonstrate decreased accessory muscle activity during PFM contraction MET  4. Pt with demonstrate normal voluntary relaxation of the pelvic floor muscle group to improve pelvic floor ROM and decrease pain. Not Met  5. Pt will independently perform proper toilet position to improve bowel emptying and decrease pain with bowel movements. MET  6. Pt will decrease pad usage to 1/day Met  7. Pt will increase fluid intake by 2 glasses per day MET    Observation/Orthostatic Postural Assessment:          Normal external genitalia. Palpation:          Increased tightness and tone to pelvic floor. ROM:          Decreased due to high tone  Strength:          P: Power, E: Endurance, R: Repetitions, QF: Quick Flicks, TrA: Transverse Abdominus, DB: Diaphragmatic Breathing  P 2   E 1   R 1   QF     TrA     DB         Urinary: Frequency 3-4 x/day, 1 x/night. Positive for mixed urinary incontinence (SHON), urgency. Pt uses not use pads for protects; 0 pads per day (PPD). Denies  JUSTO, frequency,  urinary hesitancy, incomplete emptying, dysuria, hematuria. Fluid intake: 0 oz water/day; bladder irritants include: mountain dew, tea, coffee  Bowel: Frequency 1 every day. Denies pain with bowel movement (BM), pushing/straining with BM, incomplete emptying, fecal incontinence, constipation. Sexual: Pt is sexually active. Male partners. Positive for dyspareunia. .  Pelvic Organ Prolapse/Pelvic Pain: none.       Outcome Measure:   Pelvic Floor Impact Questionnaire (PFIQ-7)  Score (out of 300) Initial:   Bladder/urinary: 28.57  Bowel/rectum: 0  Vagina/pelvis: 28.57  Total: 51 Most Recent:   Bladder/urinary: 38  Bowel/rectum: 28  Vagina/pelvis: 33  Total: 100     Interpretation of Score: This survey asks questions concerning certain bowel, bladder, or pelvic symptoms and how much these symptoms interfere with daily activities. Each section is scored on a 0-3 scale, 3 representing the greatest disability. The scores of each section (out of 100) are added together for a total score out of 300. Total Treatment Duration:   PT Patient Time In/Time Out  Time In: 0900  Time Out: [place] [id number], DPT      Regarding [facility] therapy, I certify that the treatment plan above will be carried out by a therapist or under their direction.   Thank you for this referral,  Barbara Estevez DPT     Referring Physician Signature: Roe Charles, DO              Date

## 2019-07-09 PROBLEM — N30.10 INTERSTITIAL CYSTITIS: Status: ACTIVE | Noted: 2019-07-09

## 2019-07-31 PROBLEM — N81.6 RECTOCELE: Status: ACTIVE | Noted: 2019-07-31

## 2019-09-19 ENCOUNTER — HOSPITAL ENCOUNTER (OUTPATIENT)
Dept: SURGERY | Age: 34
Discharge: HOME | End: 2019-09-19
Payer: COMMERCIAL

## 2019-09-19 VITALS
HEIGHT: 62 IN | HEART RATE: 84 BPM | DIASTOLIC BLOOD PRESSURE: 81 MMHG | TEMPERATURE: 96 F | BODY MASS INDEX: 22.63 KG/M2 | OXYGEN SATURATION: 99 % | SYSTOLIC BLOOD PRESSURE: 104 MMHG | RESPIRATION RATE: 19 BRPM | WEIGHT: 123 LBS

## 2019-09-19 LAB — HGB BLD-MCNC: 13.5 G/DL (ref 11.7–15.4)

## 2019-09-19 PROCEDURE — 85018 HEMOGLOBIN: CPT

## 2019-09-19 NOTE — PERIOP NOTES
Patient verified name and     Order for consent was found in EHR and matches case posting; patient verified. Type 3 surgery, PAT walk in assessment complete. Labs per surgeon: none found in EMR  Labs per anesthesia protocol: Hemoglobin; results pending  EKG: not required per anesthesia guidelines    Hospital approved surgical skin cleanser and instructions given per hospital policy. Patient provided with and instructed on educational handouts including Guide to Surgery, Pain Management, Hand Hygiene, Blood Transfusion Education, and [facility] Brochure. Patient answered medical/surgical history questions at their best of ability. All prior to admission medications documented in [facility]. Original medication prescription bottle was visualized during patient appointment. Patient instructed to hold all vitamins 7 days prior to surgery and NSAIDS 5 days prior to surgery, patient verbalized understanding. Prescription medications to hold: none    Patient instructed to continue previous medications as prescribed prior to surgery and to take the following medications the day of surgery according to anesthesia guidelines with a small sip of water: Gabapentin and Flexeril. Patient teach back successful and patient demonstrates knowledge of instructions.

## 2019-09-19 NOTE — PERIOP NOTES
Lab results within anesthesia guidelines.     Recent Results (from the past 12 hour(s))   HEMOGLOBIN    Collection Time: 09/19/19  1:19 PM   Result Value Ref Range    HGB 13.5 11.7 - 15.4 g/dL

## 2019-09-25 ENCOUNTER — ANESTHESIA EVENT (OUTPATIENT)
Dept: SURGERY | Age: 34
End: 2019-09-25
Payer: COMMERCIAL

## 2019-09-26 ENCOUNTER — HOSPITAL ENCOUNTER (OUTPATIENT)
Age: 34
Setting detail: OBSERVATION
Discharge: HOME | End: 2019-09-27
Attending: OBSTETRICS & GYNECOLOGY | Admitting: OBSTETRICS & GYNECOLOGY
Payer: COMMERCIAL

## 2019-09-26 ENCOUNTER — ANESTHESIA (OUTPATIENT)
Dept: SURGERY | Age: 34
End: 2019-09-26
Payer: COMMERCIAL

## 2019-09-26 DIAGNOSIS — G89.18 POST-OP PAIN: Primary | ICD-10-CM

## 2019-09-26 PROBLEM — N92.0 MENORRHAGIA: Status: ACTIVE | Noted: 2019-09-26

## 2019-09-26 PROBLEM — N94.6 SEVERE DYSMENORRHEA: Status: ACTIVE | Noted: 2019-09-26

## 2019-09-26 LAB
ABO + RH BLD: NORMAL
BLOOD GROUP ANTIBODIES SERPL: NORMAL
ERYTHROCYTE [DISTWIDTH] IN BLOOD BY AUTOMATED COUNT: 12 % (ref 11.9–14.6)
HCG UR QL: NEGATIVE
HCT VFR BLD AUTO: 40.9 % (ref 35.8–46.3)
HGB BLD-MCNC: 13.5 G/DL (ref 11.7–15.4)
MCH RBC QN AUTO: 32 PG (ref 26.1–32.9)
MCHC RBC AUTO-ENTMCNC: 33 G/DL (ref 31.4–35)
MCV RBC AUTO: 96.9 FL (ref 79.6–97.8)
NRBC # BLD: 0 K/UL (ref 0–0.2)
PLATELET # BLD AUTO: 203 K/UL (ref 150–450)
PMV BLD AUTO: 9.8 FL (ref 9.4–12.3)
RBC # BLD AUTO: 4.22 M/UL (ref 4.05–5.2)
SPECIMEN EXP DATE BLD: NORMAL
WBC # BLD AUTO: 6.4 K/UL (ref 4.3–11.1)

## 2019-09-26 PROCEDURE — 99218 HC RM OBSERVATION: CPT

## 2019-09-26 PROCEDURE — 77030027743 HC APPL F/HEMSTAT BARD -B: Performed by: OBSTETRICS & GYNECOLOGY

## 2019-09-26 PROCEDURE — 85027 COMPLETE CBC AUTOMATED: CPT

## 2019-09-26 PROCEDURE — 76060000035 HC ANESTHESIA 2 TO 2.5 HR: Performed by: OBSTETRICS & GYNECOLOGY

## 2019-09-26 PROCEDURE — 77030008606 HC TRCR ENDOSC KII AMR -B: Performed by: OBSTETRICS & GYNECOLOGY

## 2019-09-26 PROCEDURE — 77030031139 HC SUT VCRL2 J&J -A: Performed by: OBSTETRICS & GYNECOLOGY

## 2019-09-26 PROCEDURE — 77030039266 HC ADH SKN EXOFIN S2SG -A: Performed by: OBSTETRICS & GYNECOLOGY

## 2019-09-26 PROCEDURE — 88307 TISSUE EXAM BY PATHOLOGIST: CPT

## 2019-09-26 PROCEDURE — 77030003029 HC SUT VCRL J&J -B: Performed by: OBSTETRICS & GYNECOLOGY

## 2019-09-26 PROCEDURE — 77030003666 HC NDL SPINAL BD -A: Performed by: OBSTETRICS & GYNECOLOGY

## 2019-09-26 PROCEDURE — 77030020829: Performed by: OBSTETRICS & GYNECOLOGY

## 2019-09-26 PROCEDURE — 74011250636 HC RX REV CODE- 250/636: Performed by: OBSTETRICS & GYNECOLOGY

## 2019-09-26 PROCEDURE — 74011250636 HC RX REV CODE- 250/636

## 2019-09-26 PROCEDURE — 76010000171 HC OR TIME 2 TO 2.5 HR INTENSV-TIER 1: Performed by: OBSTETRICS & GYNECOLOGY

## 2019-09-26 PROCEDURE — 81025 URINE PREGNANCY TEST: CPT

## 2019-09-26 PROCEDURE — 77030002888 HC SUT CHRMC J&J -A: Performed by: OBSTETRICS & GYNECOLOGY

## 2019-09-26 PROCEDURE — 77030039425 HC BLD LARYNG TRULITE DISP TELE -A: Performed by: NURSE ANESTHETIST, CERTIFIED REGISTERED

## 2019-09-26 PROCEDURE — 77030018836 HC SOL IRR NACL ICUM -A: Performed by: OBSTETRICS & GYNECOLOGY

## 2019-09-26 PROCEDURE — 74011250637 HC RX REV CODE- 250/637: Performed by: OBSTETRICS & GYNECOLOGY

## 2019-09-26 PROCEDURE — 77030022703 HC LIGASURE  BLNT LAPSCP SEAL COVD -E: Performed by: OBSTETRICS & GYNECOLOGY

## 2019-09-26 PROCEDURE — 77030027744 HC PWDR HEMSTAT ARISTA ABSRB 5GM BARD -D: Performed by: OBSTETRICS & GYNECOLOGY

## 2019-09-26 PROCEDURE — 86900 BLOOD TYPING SEROLOGIC ABO: CPT

## 2019-09-26 PROCEDURE — 74011000258 HC RX REV CODE- 258: Performed by: OBSTETRICS & GYNECOLOGY

## 2019-09-26 PROCEDURE — 74011000250 HC RX REV CODE- 250

## 2019-09-26 PROCEDURE — 77030008522 HC TBNG INSUF LAPRO STRY -B: Performed by: OBSTETRICS & GYNECOLOGY

## 2019-09-26 PROCEDURE — 77030019940 HC BLNKT HYPOTHRM STRY -B: Performed by: NURSE ANESTHETIST, CERTIFIED REGISTERED

## 2019-09-26 PROCEDURE — 77030019895 HC PCKNG STRP IODO -A: Performed by: OBSTETRICS & GYNECOLOGY

## 2019-09-26 PROCEDURE — 77030034849: Performed by: OBSTETRICS & GYNECOLOGY

## 2019-09-26 PROCEDURE — 74011250637 HC RX REV CODE- 250/637: Performed by: ANESTHESIOLOGY

## 2019-09-26 PROCEDURE — 76210000016 HC OR PH I REC 1 TO 1.5 HR: Performed by: OBSTETRICS & GYNECOLOGY

## 2019-09-26 PROCEDURE — 77030040361 HC SLV COMPR DVT MDII -B: Performed by: OBSTETRICS & GYNECOLOGY

## 2019-09-26 PROCEDURE — 77030037088 HC TUBE ENDOTRACH ORAL NSL COVD-A: Performed by: NURSE ANESTHETIST, CERTIFIED REGISTERED

## 2019-09-26 PROCEDURE — 77030018832 HC SOL IRR H20 ICUM -A: Performed by: OBSTETRICS & GYNECOLOGY

## 2019-09-26 PROCEDURE — 74011250636 HC RX REV CODE- 250/636: Performed by: ANESTHESIOLOGY

## 2019-09-26 PROCEDURE — 77030003578 HC NDL INSUF VERES AMR -B: Performed by: OBSTETRICS & GYNECOLOGY

## 2019-09-26 PROCEDURE — A4315 CATH W/DRAINAGE 2-WAY SILCNE: HCPCS

## 2019-09-26 PROCEDURE — 77030008756 HC TU IRR SUC STRY -B: Performed by: OBSTETRICS & GYNECOLOGY

## 2019-09-26 RX ORDER — LIDOCAINE HYDROCHLORIDE 20 MG/ML
INJECTION, SOLUTION EPIDURAL; INFILTRATION; INTRACAUDAL; PERINEURAL AS NEEDED
Status: DISCONTINUED | OUTPATIENT
Start: 2019-09-26 | End: 2019-09-26 | Stop reason: HOSPADM

## 2019-09-26 RX ORDER — SODIUM CHLORIDE 0.9 % (FLUSH) 0.9 %
5-40 SYRINGE (ML) INJECTION AS NEEDED
Status: DISCONTINUED | OUTPATIENT
Start: 2019-09-26 | End: 2019-09-26 | Stop reason: HOSPADM

## 2019-09-26 RX ORDER — SODIUM CHLORIDE, SODIUM LACTATE, POTASSIUM CHLORIDE, CALCIUM CHLORIDE 600; 310; 30; 20 MG/100ML; MG/100ML; MG/100ML; MG/100ML
75 INJECTION, SOLUTION INTRAVENOUS CONTINUOUS
Status: DISCONTINUED | OUTPATIENT
Start: 2019-09-26 | End: 2019-09-26 | Stop reason: HOSPADM

## 2019-09-26 RX ORDER — IBUPROFEN 800 MG/1
800 TABLET ORAL EVERY 8 HOURS
Status: DISCONTINUED | OUTPATIENT
Start: 2019-09-26 | End: 2019-09-27 | Stop reason: HOSPADM

## 2019-09-26 RX ORDER — FACIAL-BODY WIPES
10 EACH TOPICAL DAILY PRN
Status: DISCONTINUED | OUTPATIENT
Start: 2019-09-26 | End: 2019-09-27 | Stop reason: HOSPADM

## 2019-09-26 RX ORDER — NALOXONE HYDROCHLORIDE 0.4 MG/ML
0.4 INJECTION, SOLUTION INTRAMUSCULAR; INTRAVENOUS; SUBCUTANEOUS AS NEEDED
Status: DISCONTINUED | OUTPATIENT
Start: 2019-09-26 | End: 2019-09-27 | Stop reason: HOSPADM

## 2019-09-26 RX ORDER — ONDANSETRON 2 MG/ML
4 INJECTION INTRAMUSCULAR; INTRAVENOUS
Status: DISCONTINUED | OUTPATIENT
Start: 2019-09-26 | End: 2019-09-27 | Stop reason: HOSPADM

## 2019-09-26 RX ORDER — HYDROMORPHONE HYDROCHLORIDE 1 MG/ML
1 INJECTION, SOLUTION INTRAMUSCULAR; INTRAVENOUS; SUBCUTANEOUS
Status: DISCONTINUED | OUTPATIENT
Start: 2019-09-26 | End: 2019-09-27 | Stop reason: HOSPADM

## 2019-09-26 RX ORDER — SODIUM CHLORIDE 0.9 % (FLUSH) 0.9 %
5-40 SYRINGE (ML) INJECTION EVERY 8 HOURS
Status: DISCONTINUED | OUTPATIENT
Start: 2019-09-26 | End: 2019-09-27 | Stop reason: HOSPADM

## 2019-09-26 RX ORDER — DIPHENHYDRAMINE HYDROCHLORIDE 50 MG/ML
12.5 INJECTION, SOLUTION INTRAMUSCULAR; INTRAVENOUS
Status: DISCONTINUED | OUTPATIENT
Start: 2019-09-26 | End: 2019-09-26 | Stop reason: HOSPADM

## 2019-09-26 RX ORDER — DEXAMETHASONE SODIUM PHOSPHATE 4 MG/ML
INJECTION, SOLUTION INTRA-ARTICULAR; INTRALESIONAL; INTRAMUSCULAR; INTRAVENOUS; SOFT TISSUE AS NEEDED
Status: DISCONTINUED | OUTPATIENT
Start: 2019-09-26 | End: 2019-09-26 | Stop reason: HOSPADM

## 2019-09-26 RX ORDER — SODIUM CHLORIDE 0.9 % (FLUSH) 0.9 %
5-40 SYRINGE (ML) INJECTION AS NEEDED
Status: DISCONTINUED | OUTPATIENT
Start: 2019-09-26 | End: 2019-09-27 | Stop reason: HOSPADM

## 2019-09-26 RX ORDER — MIDAZOLAM HYDROCHLORIDE 1 MG/ML
2 INJECTION, SOLUTION INTRAMUSCULAR; INTRAVENOUS
Status: COMPLETED | OUTPATIENT
Start: 2019-09-26 | End: 2019-09-26

## 2019-09-26 RX ORDER — OXYCODONE HYDROCHLORIDE 5 MG/1
10 TABLET ORAL
Status: DISCONTINUED | OUTPATIENT
Start: 2019-09-26 | End: 2019-09-26 | Stop reason: HOSPADM

## 2019-09-26 RX ORDER — LIDOCAINE HYDROCHLORIDE 10 MG/ML
0.1 INJECTION INFILTRATION; PERINEURAL AS NEEDED
Status: DISCONTINUED | OUTPATIENT
Start: 2019-09-26 | End: 2019-09-26 | Stop reason: HOSPADM

## 2019-09-26 RX ORDER — SCOLOPAMINE TRANSDERMAL SYSTEM 1 MG/1
1 PATCH, EXTENDED RELEASE TRANSDERMAL ONCE
Status: DISCONTINUED | OUTPATIENT
Start: 2019-09-26 | End: 2019-09-27 | Stop reason: HOSPADM

## 2019-09-26 RX ORDER — SODIUM CHLORIDE 0.9 % (FLUSH) 0.9 %
5-40 SYRINGE (ML) INJECTION EVERY 8 HOURS
Status: DISCONTINUED | OUTPATIENT
Start: 2019-09-26 | End: 2019-09-26 | Stop reason: HOSPADM

## 2019-09-26 RX ORDER — PHENYLEPHRINE HYDROCHLORIDE 10 MG/ML
INJECTION INTRAVENOUS AS NEEDED
Status: DISCONTINUED | OUTPATIENT
Start: 2019-09-26 | End: 2019-09-26 | Stop reason: HOSPADM

## 2019-09-26 RX ORDER — ROCURONIUM BROMIDE 10 MG/ML
INJECTION, SOLUTION INTRAVENOUS AS NEEDED
Status: DISCONTINUED | OUTPATIENT
Start: 2019-09-26 | End: 2019-09-26 | Stop reason: HOSPADM

## 2019-09-26 RX ORDER — GLYCOPYRROLATE 0.2 MG/ML
INJECTION INTRAMUSCULAR; INTRAVENOUS AS NEEDED
Status: DISCONTINUED | OUTPATIENT
Start: 2019-09-26 | End: 2019-09-26 | Stop reason: HOSPADM

## 2019-09-26 RX ORDER — KETOROLAC TROMETHAMINE 30 MG/ML
INJECTION, SOLUTION INTRAMUSCULAR; INTRAVENOUS AS NEEDED
Status: DISCONTINUED | OUTPATIENT
Start: 2019-09-26 | End: 2019-09-26 | Stop reason: HOSPADM

## 2019-09-26 RX ORDER — OXYCODONE HYDROCHLORIDE 5 MG/1
5 TABLET ORAL
Status: DISCONTINUED | OUTPATIENT
Start: 2019-09-26 | End: 2019-09-26 | Stop reason: HOSPADM

## 2019-09-26 RX ORDER — DIPHENHYDRAMINE HYDROCHLORIDE 50 MG/ML
12.5 INJECTION, SOLUTION INTRAMUSCULAR; INTRAVENOUS
Status: DISCONTINUED | OUTPATIENT
Start: 2019-09-26 | End: 2019-09-27 | Stop reason: HOSPADM

## 2019-09-26 RX ORDER — HYDROMORPHONE HYDROCHLORIDE 2 MG/ML
0.5 INJECTION, SOLUTION INTRAMUSCULAR; INTRAVENOUS; SUBCUTANEOUS
Status: DISCONTINUED | OUTPATIENT
Start: 2019-09-26 | End: 2019-09-26 | Stop reason: HOSPADM

## 2019-09-26 RX ORDER — PROPOFOL 10 MG/ML
INJECTION, EMULSION INTRAVENOUS AS NEEDED
Status: DISCONTINUED | OUTPATIENT
Start: 2019-09-26 | End: 2019-09-26 | Stop reason: HOSPADM

## 2019-09-26 RX ORDER — GABAPENTIN 300 MG/1
300 CAPSULE ORAL ONCE
Status: DISCONTINUED | OUTPATIENT
Start: 2019-09-26 | End: 2019-09-26 | Stop reason: HOSPADM

## 2019-09-26 RX ORDER — NEOSTIGMINE METHYLSULFATE 1 MG/ML
INJECTION INTRAVENOUS AS NEEDED
Status: DISCONTINUED | OUTPATIENT
Start: 2019-09-26 | End: 2019-09-26 | Stop reason: HOSPADM

## 2019-09-26 RX ORDER — FENTANYL CITRATE 50 UG/ML
INJECTION, SOLUTION INTRAMUSCULAR; INTRAVENOUS AS NEEDED
Status: DISCONTINUED | OUTPATIENT
Start: 2019-09-26 | End: 2019-09-26 | Stop reason: HOSPADM

## 2019-09-26 RX ORDER — ZOLPIDEM TARTRATE 5 MG/1
5 TABLET ORAL
Status: DISCONTINUED | OUTPATIENT
Start: 2019-09-26 | End: 2019-09-27 | Stop reason: HOSPADM

## 2019-09-26 RX ORDER — NALOXONE HYDROCHLORIDE 0.4 MG/ML
0.1 INJECTION, SOLUTION INTRAMUSCULAR; INTRAVENOUS; SUBCUTANEOUS
Status: DISCONTINUED | OUTPATIENT
Start: 2019-09-26 | End: 2019-09-26 | Stop reason: HOSPADM

## 2019-09-26 RX ORDER — ONDANSETRON 2 MG/ML
INJECTION INTRAMUSCULAR; INTRAVENOUS AS NEEDED
Status: DISCONTINUED | OUTPATIENT
Start: 2019-09-26 | End: 2019-09-26 | Stop reason: HOSPADM

## 2019-09-26 RX ORDER — FLUMAZENIL 0.1 MG/ML
0.2 INJECTION INTRAVENOUS AS NEEDED
Status: DISCONTINUED | OUTPATIENT
Start: 2019-09-26 | End: 2019-09-26 | Stop reason: HOSPADM

## 2019-09-26 RX ORDER — EPHEDRINE SULFATE 50 MG/ML
INJECTION, SOLUTION INTRAVENOUS AS NEEDED
Status: DISCONTINUED | OUTPATIENT
Start: 2019-09-26 | End: 2019-09-26 | Stop reason: HOSPADM

## 2019-09-26 RX ORDER — OXYCODONE AND ACETAMINOPHEN 7.5; 325 MG/1; MG/1
1 TABLET ORAL
Status: DISCONTINUED | OUTPATIENT
Start: 2019-09-26 | End: 2019-09-27 | Stop reason: HOSPADM

## 2019-09-26 RX ORDER — PHENAZOPYRIDINE HYDROCHLORIDE 95 MG/1
190 TABLET ORAL 3 TIMES DAILY
Status: DISCONTINUED | OUTPATIENT
Start: 2019-09-26 | End: 2019-09-27 | Stop reason: HOSPADM

## 2019-09-26 RX ADMIN — LIDOCAINE HYDROCHLORIDE 60 MG: 20 INJECTION, SOLUTION EPIDURAL; INFILTRATION; INTRACAUDAL; PERINEURAL at 07:53

## 2019-09-26 RX ADMIN — ROCURONIUM BROMIDE 40 MG: 10 INJECTION, SOLUTION INTRAVENOUS at 07:53

## 2019-09-26 RX ADMIN — Medication 5 ML: at 21:12

## 2019-09-26 RX ADMIN — OXYCODONE HYDROCHLORIDE AND ACETAMINOPHEN 1 TABLET: 7.5; 325 TABLET ORAL at 11:48

## 2019-09-26 RX ADMIN — EPHEDRINE SULFATE 10 MG: 50 INJECTION, SOLUTION INTRAVENOUS at 08:30

## 2019-09-26 RX ADMIN — Medication 10 ML: at 14:22

## 2019-09-26 RX ADMIN — HYDROMORPHONE HYDROCHLORIDE 0.5 MG: 2 INJECTION INTRAMUSCULAR; INTRAVENOUS; SUBCUTANEOUS at 10:21

## 2019-09-26 RX ADMIN — URINARY PAIN RELIEF 190 MG: 95 TABLET ORAL at 16:46

## 2019-09-26 RX ADMIN — MIDAZOLAM 2 MG: 1 INJECTION INTRAMUSCULAR; INTRAVENOUS at 07:24

## 2019-09-26 RX ADMIN — FENTANYL CITRATE 100 MCG: 50 INJECTION, SOLUTION INTRAMUSCULAR; INTRAVENOUS at 07:53

## 2019-09-26 RX ADMIN — HYDROMORPHONE HYDROCHLORIDE 0.5 MG: 2 INJECTION INTRAMUSCULAR; INTRAVENOUS; SUBCUTANEOUS at 10:26

## 2019-09-26 RX ADMIN — CEFOXITIN 2 G: 2 INJECTION, POWDER, FOR SOLUTION INTRAVENOUS at 07:49

## 2019-09-26 RX ADMIN — IBUPROFEN 800 MG: 800 TABLET, FILM COATED ORAL at 14:20

## 2019-09-26 RX ADMIN — EPHEDRINE SULFATE 10 MG: 50 INJECTION, SOLUTION INTRAVENOUS at 09:09

## 2019-09-26 RX ADMIN — PROPOFOL 200 MG: 10 INJECTION, EMULSION INTRAVENOUS at 07:53

## 2019-09-26 RX ADMIN — ONDANSETRON 4 MG: 2 INJECTION INTRAMUSCULAR; INTRAVENOUS at 09:42

## 2019-09-26 RX ADMIN — EPHEDRINE SULFATE 10 MG: 50 INJECTION, SOLUTION INTRAVENOUS at 08:35

## 2019-09-26 RX ADMIN — SODIUM CHLORIDE, SODIUM LACTATE, POTASSIUM CHLORIDE, AND CALCIUM CHLORIDE: 600; 310; 30; 20 INJECTION, SOLUTION INTRAVENOUS at 08:40

## 2019-09-26 RX ADMIN — ONDANSETRON 4 MG: 2 INJECTION INTRAMUSCULAR; INTRAVENOUS at 21:09

## 2019-09-26 RX ADMIN — HYDROMORPHONE HYDROCHLORIDE 1 MG: 1 INJECTION, SOLUTION INTRAMUSCULAR; INTRAVENOUS; SUBCUTANEOUS at 16:47

## 2019-09-26 RX ADMIN — SODIUM CHLORIDE, SODIUM LACTATE, POTASSIUM CHLORIDE, AND CALCIUM CHLORIDE: 600; 310; 30; 20 INJECTION, SOLUTION INTRAVENOUS at 07:45

## 2019-09-26 RX ADMIN — KETOROLAC TROMETHAMINE 30 MG: 30 INJECTION, SOLUTION INTRAMUSCULAR; INTRAVENOUS at 09:53

## 2019-09-26 RX ADMIN — DEXAMETHASONE SODIUM PHOSPHATE 5 MG: 4 INJECTION, SOLUTION INTRA-ARTICULAR; INTRALESIONAL; INTRAMUSCULAR; INTRAVENOUS; SOFT TISSUE at 08:18

## 2019-09-26 RX ADMIN — URINARY PAIN RELIEF 190 MG: 95 TABLET ORAL at 21:01

## 2019-09-26 RX ADMIN — GLYCOPYRROLATE 0.4 MG: 0.2 INJECTION INTRAMUSCULAR; INTRAVENOUS at 09:53

## 2019-09-26 RX ADMIN — IBUPROFEN 800 MG: 800 TABLET, FILM COATED ORAL at 22:31

## 2019-09-26 RX ADMIN — ROCURONIUM BROMIDE 10 MG: 10 INJECTION, SOLUTION INTRAVENOUS at 08:48

## 2019-09-26 RX ADMIN — NEOSTIGMINE METHYLSULFATE 3 MG: 1 INJECTION INTRAVENOUS at 09:53

## 2019-09-26 RX ADMIN — OXYCODONE HYDROCHLORIDE AND ACETAMINOPHEN 1 TABLET: 7.5; 325 TABLET ORAL at 21:01

## 2019-09-26 NOTE — H&P
[phone]     Stephanie Morrison  : 1985          Chief Complaint   Patient presents with    Other       Bladder pain, incontinence. Discuss CMG         HPI      Stephanie Morrison is a 29 y.o. female  Referred by Dr. Marquita Morrow for evaluation and treatment of stress incontinence. Has irregular menses unresponsive to medical tx. Gyn plans HAJA, BIJAL.   She had a kidney stone in  while pregnant. She c/o dyspareunia, bladder hurts during sex. She leaks when she laughs or coughs. C/o hesitancy, slow stream, straining, urge incontinence, sensation of incomplete emptying . Exam showed mobile urethra, tender bladder,  ml. Went to Dr. Sebastian Ko, treated for IC and had pelvic floor PT.CMG read by me today doesn't show evidence of OAB. She c/o urgency, no incontinence. She c/o prolapse, but states that Dr. Marquita Morrow couldn't see it. She shows me a video on her phone, it seems to show a rectocele protruding from her vagina.              Past Medical History:   Diagnosis Date    Anxiety      Depression      Depression      Kidney stone      Neuropathy      Other ill-defined conditions(791.29)       kidney stones ovarian cyst    Restless leg syndrome      Restless leg syndrome              Past Surgical History:   Procedure Laterality Date    FRAGMENT KIDNEY STONE/ ESWL        HX TUBAL LIGATION       HX UROLOGICAL         lithotripsy    LAP,TUBAL CAUTERY                 Current Outpatient Medications   Medication Sig Dispense Refill    gabapentin (NEURONTIN) 300 mg capsule Take 300 mg by mouth three (3) times daily.        cyclobenzaprine (FLEXERIL) 10 mg tablet Take  by mouth three (3) times daily as needed for Muscle Spasm(s).                 Allergies   Allergen Reactions    Chantix [Varenicline] Other (comments)       hallucinations    Ciprofibrate Hives      Social History            Socioeconomic History    Marital status: SINGLE       Spouse name: Not on file    Number of children: Not on file    Years of education: Not on file    Highest education level: Not on file   Occupational History    Not on file   Social Needs    Financial resource strain: Not on file    Food insecurity:       Worry: Not on file       Inability: Not on file    Transportation needs:       Medical: Not on file       Non-medical: Not on file   Tobacco Use    Smoking status: Current Every Day Smoker    Smokeless tobacco: Never Used   Substance and Sexual Activity    Alcohol use: No       Frequency: Never       Comment: socially    Drug use: No    Sexual activity: Yes       Partners: Male       Birth control/protection: Surgical       Comment: tubal   Lifestyle    Physical activity:       Days per week: Not on file       Minutes per session: Not on file    Stress: Not on file   Relationships    Social connections:       Talks on phone: Not on file       Gets together: Not on file       Attends Restoration service: Not on file       Active member of club or organization: Not on file       Attends meetings of clubs or organizations: Not on file       Relationship status: Not on file    Intimate partner violence:       Fear of current or ex partner: Not on file       Emotionally abused: Not on file       Physically abused: Not on file       Forced sexual activity: Not on file   Other Topics Concern    Not on file   Social History Narrative    Not on file            Family History   Problem Relation Age of Onset    Breast Cancer Mother      Cancer Mother           cervical    Diabetes Paternal Uncle      Hypertension Maternal Grandmother      Osteoporosis Maternal Grandmother           Review of Systems  Constitutional:   Negative for fever and headaches. ENT:  Negative for high frequency hearing loss. Respiratory:  Negative for shortness of breath. Cardiovascular:  Negative for chest pain. GI:  Negative for abdominal pain. Genitourinary:  Negative for urinary burning and hematuria.   Musculoskeletal: Negative for back pain. Neurological:  Negative for numbness. Psychological:  Negative for depression. Endocrine:  Negative for fatigue. Hem/Lymphatic:  Negative for easy bruising.        Visit Vitals  Ht 5' 2\" (1.575 m)   Wt 121 lb (54.9 kg)   BMI 22.13 kg/m²         Physical Exam  General   Mental Status - Patient is alert and oriented X3. Build & Nutrition - Well nourished. Chest and Lung Exam   Chest and lung exam reveals  - normal excursion with symmetric chest walls, quiet, even and easy respiratory effort with no use of accessory muscles and on auscultation, normal breath sounds, no adventitious sounds and normal vocal resonance. Cardiovascular   Cardiovascular examination reveals  - normal heart sounds, regular rate and rhythm with no murmurs. Abdomen   Palpation/Percussion: Palpation and Percussion of the abdomen reveal - Non Tender, No Rebound tenderness, No Rigidity (guarding), No hepatosplenomegaly, No Palpable abdominal masses and Soft. Hernia - Bilateral - No Hernia(s) present.       Physical Exam     Assessment and Plan      ICD-10-CM ICD-9-CM     1. Bladder pain R39.89 788.99 AMB POC URINALYSIS DIP STICK AUTO W/ MICRO (PGU)   2. Urinary incontinence, unspecified type R32 788.30 AMB POC URINALYSIS DIP STICK AUTO W/ MICRO (PGU)             Orders Placed This Encounter    AMB POC URINALYSIS DIP STICK AUTO W/ MICRO (PGU)   I think that her urgency and dyspareunia are partially due to interstitial cystitis. She does seem to have rectocele. She will have hysterectomy, rectocele repair by .  I recommend cysto and hydrodistention of bladder at same time.

## 2019-09-26 NOTE — PROGRESS NOTES
Veronica removed with tip intact. One piece vaginal packing removed with  no complications. Pt tolerated it well.

## 2019-09-26 NOTE — OP NOTES
[place]  OPERATIVE REPORT    Name:  Deana Alcala  MR#:  [id number]  :  1985  ACCOUNT #:  [de-identified]  DATE OF SERVICE:  2019    PREOPERATIVE DIAGNOSIS:  Pelvic pain. POSTOPERATIVE DIAGNOSIS:  Interstitial cystitis. PROCEDURE PERFORMED:  Cystoscopy and hydrodistention of the bladder. SURGEON:  John Ohara. Magali Spencer MD    ASSISTANT:  None. ANESTHESIA:  General.    COMPLICATIONS:  None. SPECIMENS REMOVED:  None. IMPLANTS:  None. ESTIMATED BLOOD LOSS:  None. FINDINGS:  Diffuse glomerulations noted in the bladder mucosa after hydrodistention. DESCRIPTION OF PROCEDURE:  The patient was given a general anesthetic, placed in the dorsal lithotomy position. She was prepped and draped in sterile fashion for cystoscopy and hysterectomy. I inserted a 25-Uzbek cystoscope into the urethra using the video camera and 30-degree lens. The urethra was normal.  Bladder mucosa appeared somewhat pale, but showed no stones or tumors and both ureteral orifices appeared normal.    Bladder was distended with the irrigant approximately 80 cm anterior to the level of the bladder. It was held to full distention for 8 minutes and then allowed to drain. The capacity was approximately 1400 mL. The terminal drain was bloody. Bladder mucosa was inspected after drainage using the cystoscope. There were diffuse glomerulations noted throughout the bladder. No evidence of any trauma. The appearance was consistent with interstitial cystitis. Scope was then removed and the Veronica catheter placed and left indwelling. At this time, Dr. Angela Cervantes scrubbed into perform the hysterectomy.       Christianne Kawasaki, MD [id number]  D:  2019 8:45  T:  2019 13:48  JOB #:  [id number]

## 2019-09-26 NOTE — PERIOP NOTES
TRANSFER - OUT REPORT:    Verbal report given to Levi Crain RN on Holly Fish  being transferred to  for routine post - op       Report consisted of patients Situation, Background, Assessment and   Recommendations(SBAR). Information from the following report(s) OR Summary, Procedure Summary, Intake/Output and MAR was reviewed with the receiving nurse. Opportunity for questions and clarification was provided. Patient transported with:   O2 @ 2 liters    Vaginal packing reported to receiving RN.

## 2019-09-26 NOTE — ANESTHESIA PREPROCEDURE EVALUATION
Relevant Problems   No relevant active problems       Anesthetic History   No history of anesthetic complications            Review of Systems / Medical History  Patient summary reviewed and pertinent labs reviewed    Pulmonary          Smoker         Neuro/Psych         Psychiatric history     Cardiovascular  Within defined limits                Exercise tolerance: >4 METS     GI/Hepatic/Renal  Within defined limits              Endo/Other  Within defined limits           Other Findings              Physical Exam    Airway  Mallampati: II  TM Distance: 4 - 6 cm  Neck ROM: normal range of motion   Mouth opening: Normal     Cardiovascular    Rhythm: regular  Rate: normal         Dental  No notable dental hx       Pulmonary  Breath sounds clear to auscultation               Abdominal         Other Findings            Anesthetic Plan    ASA: 2  Anesthesia type: general            Anesthetic plan and risks discussed with: Patient

## 2019-09-26 NOTE — PROGRESS NOTES
TRANSFER - IN REPORT:    Verbal report received from Lencho RN(name) on Jamse Ear  being received from [employer]) for routine post - op      Report consisted of patients Situation, Background, Assessment and   Recommendations(SBAR). Information from the following report(s) SBAR, Kardex and MAR was reviewed with the receiving nurse. Opportunity for questions and clarification was provided. Assessment completed upon patients arrival to unit and care assumed.

## 2019-09-26 NOTE — ANESTHESIA POSTPROCEDURE EVALUATION
Procedure(s): HYSTERECTOMY VAGINAL ASSISTED LAPAROSCOPIC BILATERAL SALPINGECTOMY  RECTOCELE REPAIR  CYSTOSCOPY HYDRODISTENTION.     general    Anesthesia Post Evaluation      Multimodal analgesia: multimodal analgesia used between 6 hours prior to anesthesia start to PACU discharge  Patient location during evaluation: bedside  Patient participation: complete - patient participated  Level of consciousness: awake and alert  Pain score: 3  Pain management: adequate  Airway patency: patent  Anesthetic complications: no  Cardiovascular status: acceptable and hemodynamically stable  Respiratory status: acceptable  Hydration status: acceptable  Post anesthesia nausea and vomiting:  none      Vitals Value Taken Time   /60 9/26/2019 11:06 AM   Temp 36.6 °C (97.8 °F) 9/26/2019 10:11 AM   Pulse 74 9/26/2019 11:06 AM   Resp 16 9/26/2019 11:06 AM   SpO2 100 % 9/26/2019 11:06 AM

## 2019-09-26 NOTE — OP NOTES
OP NOTE        NAME:     Allison Aleman    DATE OF PROCEDURE:  9/26/2019    PREOPERATIVE DIAGNOSIS:  Dysmenorrhea [N94.6]  Menorrhagia with irregular cycle [N92.1]  Rectocele [N81.6]  Bladder pain [R39.89]    POSTOPERATIVE DIAGNOSIS:  Dysmenorrhea [N94.6]  Menorrhagia with irregular cycle [N92.1]  Rectocele [N81.6]-3rd degree  Bladder pain [R39.89]  IC  PROCEDURE: Laparascopic-assisted vaginal hysterectomy with bilateral salpingectomy. Marcia Holguin Posterior repair      Dr Mei Rod preceded the case with hydrodistention of the bladder      SURGEON:  Brenda Jensen MD    ASSISTANT: Dr Cervantes Buster:  General endotracheal anesthesia. EBL: 150 cc    FINDINGS: large rectocele    This case was complex for the following reasons necessitating the assistance of Dr Sridevi Wilson. An assistant was necessary due the difficult exposure frequently encountered with LAVHs. It is difficult to maintain pneumoperitoneum and a skilled assistant facilitates final dissection/removal of the right lateral portion of the specimen in a timely and safe fashion while taking steps to maintain pneumoperitoneum. PROCEDURE: The patient was placed on the operating room table in the supine position. Time out was done to confirm the operating procedure, surgeon, patient and site. Once confirmed by the team, procedure was started. The patient underwent general endotracheal anesthesia and was repositioned in the dorsal lithotomy position and prepped and draped in the usual fashion for laparoscopic surgery. Cervix was visualized with the aid of a heavy weight speculum, cervix was grasped with a single tooth tennaculm, bender cannuala was placed in the cervical os for manipulation during surgery. A subumbilical incision was made. Veres needle was inserted, and the abdomen was filled with 2.5 liters of CO2. A 5-mm trocar was then placed through the subumbilical incision followed by introduction of the laparoscope.  Under direct laparoscopic vision, a 5 mm trocar was placed lateral to the rectus muscle on the patient's right, then a 5-mm trocar was placed lateral to the rectus muscle on the patients left. Evaluation of the pelvis revealed normal U,Tand O.  Ligasure device was then placed through the lateral trocar. The tubes were removed with the aide of the Ligasure. The Ligasure pedicles were developed on the left utero-ovarian ligamnet and left round ligament with numerous pedicles developed down through the broad ligament to the lower uterine segment. A pedicle was then developed on the right utero-ovarian ligament and right round ligament with numerous pedicles developed down through the broad ligament to the lower uterine segment. Hemostasis appeared adequate. With the scope in the subumbilical incision, laparoscopic Metzenbaum scissors were used to transversely incise the peritoneal reflexion of the lower uterine segment. Dissectors were used then to develop the bladder flap. The pnemoperitoneum was allowed to escape. The vaginal portion of the case was then undertaken. The cervix was exposed with a weighted speculum. The Guardado cannula was removed that had been placed previously. Thyroid tenaculum was then placed on the cervix. The cervix was then injected with dilute Juan Carlos-Synephrine solution and circumferentially incised. The cervical mucosal was advanced anteriorly and posteriorly. The cul-de-sac was sharply entered. The curved R&N clamp was used to clamp the uterosacral ligaments bilaterally. These pedicles were incised and then Luis Felipe suture ligated with 0 Vicryl. An additional pedicle was then developed through the broad ligaments, each being clamped, incised, and Luis Felipe suture ligated with 0 Vicryl. The bed of the anterior cervix was then further dissected to develop the bladder flap and enter the peritoneal cavity.  Curved R&N clamps were used to clamp the final pedicle bilaterally to meet with the aforementioned Ligasure pedicles. These pedicles were incised. The specimen was then removed from the operative field. Final pedicles were then ligated first with a free tie of 0 Vicryl followed by fore-and-aft stitch of 0 Vicryl. The pelvis was then re-peritonealized with pursestring closure of 0 Chromic. Hemostasis was then insured and the vaginal cuff was sewn closed with figure-of eight stitches of 0 Vicryl. The posterior fourchette was then incised transversally and the vaginal mucosa was undermined medially to the distal two-thirds of the vagina. The mucosa was then sharply and blunlly dissected free from the underlying connective tissue. The fascia was identified laterally, reapproximated with interrupted stitches of 0 chromic and then reinforced with a running stitch of 2-0 Vicryl. Excess vaginal mucosa was trimmed free on the posterior repair and the mucosa was then closed with a running locking stitch of 3- 0 Vicryl. Hemostasis appeared adequate. Pneumoperitoneum was then redeveloped. The laparoscope was used to ensure hemostasis. The pelvis was thoroughly irrigated and suctioned clean. CO2 was allowed to escape, instruments were removed, and the incision was closed with subcuticular 4-0 vicryl. Pt tolerated the procedure well, quin[place] to PACU in stable condition.

## 2019-09-26 NOTE — PROGRESS NOTES
09/26/19 1136   Dual Skin Pressure Injury Assessment   Dual Skin Pressure Injury Assessment WDL   Second Care Provider (Based on [address] [place] [address]) Ysabel Banegas RN   Skin Integumentary   Skin Integumentary (WDL) X    Pressure  Injury Documentation No Pressure Injury Noted-Pressure Ulcer Prevention Initiated   Skin Color Appropriate for ethnicity   Skin Condition/Temp Dry; Warm   Skin Integrity Incision (comment)  (3 PS to abdomen w/ derma juan c/d/i)

## 2019-09-27 VITALS
RESPIRATION RATE: 16 BRPM | TEMPERATURE: 96.9 F | OXYGEN SATURATION: 97 % | SYSTOLIC BLOOD PRESSURE: 100 MMHG | HEIGHT: 62 IN | HEART RATE: 66 BPM | WEIGHT: 124 LBS | BODY MASS INDEX: 22.82 KG/M2 | DIASTOLIC BLOOD PRESSURE: 49 MMHG

## 2019-09-27 LAB
HCT VFR BLD AUTO: 32.5 % (ref 35.8–46.3)
HGB BLD-MCNC: 10.8 G/DL (ref 11.7–15.4)

## 2019-09-27 PROCEDURE — 74011250637 HC RX REV CODE- 250/637: Performed by: OBSTETRICS & GYNECOLOGY

## 2019-09-27 PROCEDURE — 77030040830 HC CATH URETH FOL MDII -A

## 2019-09-27 PROCEDURE — 99218 HC RM OBSERVATION: CPT

## 2019-09-27 PROCEDURE — 74011250636 HC RX REV CODE- 250/636: Performed by: OBSTETRICS & GYNECOLOGY

## 2019-09-27 PROCEDURE — 36415 COLL VENOUS BLD VENIPUNCTURE: CPT

## 2019-09-27 PROCEDURE — 51702 INSERT TEMP BLADDER CATH: CPT

## 2019-09-27 PROCEDURE — 85018 HEMOGLOBIN: CPT

## 2019-09-27 RX ORDER — OXYCODONE AND ACETAMINOPHEN 7.5; 325 MG/1; MG/1
1 TABLET ORAL
Qty: 20 TAB | Refills: 0 | Status: SHIPPED | OUTPATIENT
Start: 2019-09-27 | End: 2019-10-04

## 2019-09-27 RX ORDER — IBUPROFEN 800 MG/1
800 TABLET ORAL EVERY 8 HOURS
Qty: 30 TAB | Refills: 0 | Status: SHIPPED | OUTPATIENT
Start: 2019-09-27 | End: 2020-07-28

## 2019-09-27 RX ADMIN — Medication 10 ML: at 06:53

## 2019-09-27 RX ADMIN — HYDROMORPHONE HYDROCHLORIDE 1 MG: 1 INJECTION, SOLUTION INTRAMUSCULAR; INTRAVENOUS; SUBCUTANEOUS at 04:54

## 2019-09-27 RX ADMIN — IBUPROFEN 800 MG: 800 TABLET, FILM COATED ORAL at 06:52

## 2019-09-27 RX ADMIN — URINARY PAIN RELIEF 190 MG: 95 TABLET ORAL at 08:02

## 2019-09-27 RX ADMIN — OXYCODONE HYDROCHLORIDE AND ACETAMINOPHEN 1 TABLET: 7.5; 325 TABLET ORAL at 08:41

## 2019-09-27 RX ADMIN — IBUPROFEN 800 MG: 800 TABLET, FILM COATED ORAL at 14:03

## 2019-09-27 RX ADMIN — Medication 5 ML: at 14:03

## 2019-09-27 RX ADMIN — OXYCODONE HYDROCHLORIDE AND ACETAMINOPHEN 1 TABLET: 7.5; 325 TABLET ORAL at 13:09

## 2019-09-27 NOTE — DISCHARGE SUMMARY
Discharge Summary     Name: Oksana Odonnell MRN: [id number]  SSN: [id number]    YOB: 1985  Age: 29 y.o. Sex: female      Allergies: Chantix [varenicline] and Ciprofibrate    Admit Date: 9/26/2019    Discharge Date: 9/27/2019      Admitting Physician: Cosme Ash MD     * Admission Diagnoses: Dysmenorrhea, Menorrhagia, Rectocele and  IC    * Discharge Diagnoses:   Hospital Problems as of 9/27/2019 Date Reviewed: 9/19/2019          Codes Class Noted - Resolved POA    Menorrhagia ICD-10-CM: N92.0  ICD-9-CM: 626.2  9/26/2019 - Present Unknown        Severe dysmenorrhea ICD-10-CM: N94.6  ICD-9-CM: 625.3  9/26/2019 - Present Unknown        Rectocele ICD-10-CM: N81.6  ICD-9-CM: 618.04  7/31/2019 - Present Unknown        Interstitial cystitis ICD-10-CM: N30.10  ICD-9-CM: 595.1  7/9/2019 - Present Unknown               * Procedures: Laparoscopic Assisted Vaginal Hysterectomy with Bilateral Salpingectomy,posterior repair bladder hydrodistension    * Discharge Condition: [facility] Hospital Course: Normal hospital course for this procedure. Significant Diagnostic Studies:   Recent Results (from the past 24 hour(s))   HGB & HCT    Collection Time: 09/27/19  4:29 AM   Result Value Ref Range    HGB 10.8 (L) 11.7 - 15.4 g/dL    HCT 32.5 (L) 35.8 - 46.3 %       * Disposition: Home    Discharge Medications:   Current Discharge Medication List      START taking these medications    Details   oxyCODONE-acetaminophen (PERCOCET 7.5) 7.5-325 mg per tablet Take 1 Tab by mouth every four (4) hours as needed for Pain for up to 7 days. Max Daily Amount: 6 Tabs. Qty: 20 Tab, Refills: 0    Associated Diagnoses: Post-op pain      ibuprofen (MOTRIN) 800 mg tablet Take 1 Tab by mouth every eight (8) hours. Qty: 30 Tab, Refills: 0    Associated Diagnoses: Post-op pain         CONTINUE these medications which have NOT CHANGED    Details   gabapentin (NEURONTIN) 300 mg capsule Take 300 mg by mouth three (3) times daily. cyclobenzaprine (FLEXERIL) 10 mg tablet Take  by mouth three (3) times daily as needed for Muscle Spasm(s). * Follow-up Care/Patient Instructions: Activity: No sex, douching, or tampons for 6 weeks or as directed by your physician. No heavy lifting for 6 weeks. No driving while taking pain medication.   Diet: Resume pre-hospital diet  Wound Care: As directed    Follow-up Information     Follow up With Specialties Details Why Contact Info    Other, Phys, MD    Patient can only remember the practice name and not the physician

## 2019-09-27 NOTE — PROGRESS NOTES
Shift assessment complete. Pt resting quietly in bed. Alert and oriented x4. Resp even and unlabored. 3 PS to abdomen c/d/i. PT c/o pain, medicated with percocet. Veronica in place and draining. Call light within reach. Pt instructed to call for assistance.

## 2019-09-27 NOTE — DISCHARGE INSTRUCTIONS
DISCHARGE SUMMARY from Nurse    PATIENT INSTRUCTIONS:    After general anesthesia or intravenous sedation, for 24 hours or while taking prescription Narcotics:  · Limit your activities  · Do not drive and operate hazardous machinery  · Do not make important personal or business decisions  · Do  not drink alcoholic beverages  · If you have not urinated within 8 hours after discharge, please contact your surgeon on call. Report the following to your surgeon:  · Excessive pain, swelling, redness or odor of or around the surgical area  · Temperature over 100.5  · Nausea and vomiting lasting longer than 4 hours or if unable to take medications  · Any signs of decreased circulation or nerve impairment to extremity: change in color, persistent  numbness, tingling, coldness or increase pain  · Any questions    What to do at Home:  Recommended activity: Activity as tolerated, take short frequent walks several times a day to prevent blood clots, no strenuous exercise or heavy lifting; no sex, douching or tampons; no tub baths or swimming, may shower, keep incisions clean and dry. Regular diet, Colace or Miralax OTC for constipation. If you experience any of the following symptoms severe abdominal pain, nausea/vomiting lasting longer than 4 hours, fever (>101) or other s/s of wound infection, heavy vaginal bleeding (>1 cordell pad/hour), please follow up with your surgeon. *  Please give a list of your current medications to your Primary Care Provider. *  Please update this list whenever your medications are discontinued, doses are      changed, or new medications (including over-the-counter products) are added. *  Please carry medication information at all times in case of emergency situations.     These are general instructions for a healthy lifestyle:    No smoking/ No tobacco products/ Avoid exposure to second hand smoke  Surgeon General's Warning:  Quitting smoking now greatly reduces serious risk to your health. Obesity, smoking, and sedentary lifestyle greatly increases your risk for illness    A healthy diet, regular physical exercise & weight monitoring are important for maintaining a healthy lifestyle    You may be retaining fluid if you have a history of heart failure or if you experience any of the following symptoms:  Weight gain of 3 pounds or more overnight or 5 pounds in a week, increased swelling in our hands or feet or shortness of breath while lying flat in bed. Please call your doctor as soon as you notice any of these symptoms; do not wait until your next office visit. Recognize signs and symptoms of STROKE:    F-face looks uneven    A-arms unable to move or move unevenly    S-speech slurred or non-existent    T-time-call 911 as soon as signs and symptoms begin-DO NOT go       Back to bed or wait to see if you get better-TIME IS BRAIN. Warning Signs of HEART ATTACK     Call 911 if you have these symptoms:   Chest discomfort. Most heart attacks involve discomfort in the center of the chest that lasts more than a few minutes, or that goes away and comes back. It can feel like uncomfortable pressure, squeezing, fullness, or pain.  Discomfort in other areas of the upper body. Symptoms can include pain or discomfort in one or both arms, the back, neck, jaw, or stomach.  Shortness of breath with or without chest discomfort.  Other signs may include breaking out in a cold sweat, nausea, or lightheadedness. Don't wait more than five minutes to call 911 - MINUTES MATTER! Fast action can save your life. Calling 911 is almost always the fastest way to get lifesaving treatment. Emergency Medical Services staff can begin treatment when they arrive -- up to an hour sooner than if someone gets to the hospital by car. The discharge information has been reviewed with the patient. The patient verbalized understanding.   Discharge medications reviewed with the patient and appropriate educational materials and side effects teaching were provided. ___________________________________________________________________________________________________________________________________    Patient Education        Laparoscopic Hysterectomy: What to Expect at Home  Your Recovery    A hysterectomy is surgery to take out the uterus. In some cases, the ovaries and fallopian tubes also are taken out at the same time. You can expect to feel better and stronger each day, but you may need pain medicine for a week or two. You may get tired easily or have less energy than usual. The tiredness may last for several weeks after surgery. You will probably notice that your belly is swollen and puffy. This is common. The swelling will take several weeks to go down. You may take about 4 to 6 weeks to fully recover. It is important to avoid lifting while you are recovering so that you can heal.  This care sheet gives you a general idea about how long it will take for you to recover. But each person recovers at a different pace. Follow the steps below to get better as quickly as possible. How can you care for yourself at home? Activity    · Rest when you feel tired.     · Be active. Walking is a good choice.     · Allow the area to heal. Don't move quickly or lift anything heavy until you are feeling better.     · You may shower 24 to 48 hours after surgery, if your doctor okays it. Pat the incision dry. Do not take a bath for the first 2 weeks, or until your doctor tells you it is okay.     · Ask your doctor when it is okay for you to have sex. Diet    · You can eat your normal diet. If your stomach is upset, try bland, low-fat foods like plain rice, broiled chicken, toast, and yogurt.     · If your bowel movements are not regular right after surgery, try to avoid constipation and straining. Drink plenty of water. Your doctor may suggest fiber, a stool softener, or a mild laxative.    Medicines    · Your doctor will tell you if and when you can restart your medicines. He or she will also give you instructions about taking any new medicines.     · If you take blood thinners, such as warfarin (Coumadin), clopidogrel (Plavix), or aspirin, be sure to talk to your doctor. He or she will tell you if and when to start taking those medicines again. Make sure that you understand exactly what your doctor wants you to do.     · Be safe with medicines. Read and follow all instructions on the label. ? If the doctor gave you a prescription medicine for pain, take it as prescribed. ? If you are not taking a prescription pain medicine, ask your doctor if you can take an over-the-counter medicine.     · If your doctor prescribed antibiotics, take them as directed. Do not stop taking them just because you feel better. You need to take the full course of antibiotics. Incision care    · You may have stitches over the cuts (incisions) the doctor made in your belly.     · If you have strips of tape on the cut (incision) the doctor made, leave the tape on for a week or until it falls off.     · Wash the area daily with warm, soapy water, and pat it dry. Don't use hydrogen peroxide or alcohol. They can slow healing.     · You may cover the area with a gauze bandage if it oozes fluid or rubs against clothing.     · Change the bandage every day. Other instructions    · You may have some light vaginal bleeding. Wear sanitary pads if needed. Do not douche or use tampons.     · Don't have sex until the doctor says it is okay. Follow-up care is a key part of your treatment and safety. Be sure to make and go to all appointments, and call your doctor if you are having problems. It's also a good idea to know your test results and keep a list of the medicines you take. When should you call for help? Call 911 anytime you think you may need emergency care.  For example, call if:    · You passed out (lost consciousness).     · You have chest pain, are short of breath, or cough up blood.    Call your doctor now or seek immediate medical care if:    · You have pain that does not get better after you take pain medicine.     · You cannot pass stools or gas.     · You have vaginal discharge that has increased in amount or smells bad.     · You are sick to your stomach or cannot drink fluids.     · You have loose stitches, or your incision comes open.     · Bright red blood has soaked through the bandage over your incision.     · You have signs of infection, such as:  ? Increased pain, swelling, warmth, or redness. ? Red streaks leading from the incision. ? Pus draining from the incision. ? A fever.     · You have bright red vaginal bleeding that soaks one or more pads in an hour, or you have large clots.     · You have signs of a blood clot in your leg (called a deep vein thrombosis), such as:  ? Pain in your calf, back of the knee, thigh, or groin. ? Redness and swelling in your leg or groin.    Watch closely for changes in your health, and be sure to contact your doctor if you have any problems. Where can you learn more? Go to http://robert-flower.info/. Enter Q131 in the search box to learn more about \"Laparoscopic Hysterectomy: What to Expect at Home. \"  Current as of: February 19, 2019  Content Version: 12.2  © 8111-8800 [employer]. Care instructions adapted under license by [employer] (which disclaims liability or warranty for this information). If you have questions about a medical condition or this instruction, always ask your healthcare professional. [place] [address] any warranty or liability for your use of this information.

## 2019-09-27 NOTE — PROGRESS NOTES
Discharge instructions were reviewed with the patient. Opportunity for questions given. Patient verbalized understanding of discharge and follow up instructions, as well as S/S to report to MD or return to ER for. PIV was removed. Pt going with mika, aware of how to care for and will call [facility] first thing Monday morning for appt to have removed. Prescriptions provided for Motrin and Percocet. Patient will D/C to home.

## 2019-09-27 NOTE — PROGRESS NOTES
Brennan cath inserting at this time in the presence of Rolley Fudge. 550 ml of orange urine obtained in brennan bag. Pt tolerated procedure well.

## 2019-09-27 NOTE — PROGRESS NOTES
Left message at [facility] with Dr. Anjali Clark assistant to call pt with appt for Monday morning for brennan removal. Pt also instructed to call office herself Monday morning.

## 2019-09-27 NOTE — PROGRESS NOTES
Spoke with Dr. Ashanti Guevara regarding brennan catheter, orders received to attempt to remove catheter again and see if pt can void. Brennan removed.

## 2019-09-27 NOTE — PROGRESS NOTES
Gynecology Progress Note    Patient doing well post-op day 1 from Procedure(s): HYSTERECTOMY VAGINAL ASSISTED LAPAROSCOPIC BILATERAL SALPINGECTOMY  RECTOCELE REPAIR  CYSTOSCOPY HYDRODISTENTION without significant complaints. Pain controlled on current medication. Voiding without difficulty. Patient is not passing flatus. Vitals:  Blood pressure 100/52, pulse 73, temperature 97.1 °F (36.2 °C), resp. rate 16, height 5' 2\" (1.575 m), weight 124 lb (56.2 kg), SpO2 98 %, not currently breastfeeding. Temp (24hrs), Av.9 °F (36.6 °C), Min:97.1 °F (36.2 °C), Max:98.4 °F (36.9 °C)        Exam:  Patient without distress. Abdomen soft,  nontender. Incision dry and clean without erythema. Lower extremities are negative for swelling, cords, or tenderness. Lab/Data Review:  CBC:   Lab Results   Component Value Date/Time    HGB 10.8 (L) 2019 04:29 AM    HCT 32.5 (L) 2019 04:29 AM       Assessment and Plan:  Patient appears to be having uncomplicated post Procedure(s): HYSTERECTOMY VAGINAL ASSISTED LAPAROSCOPIC BILATERAL SALPINGECTOMY  RECTOCELE REPAIR  CYSTOSCOPY HYDRODISTENTION course. Continue routine post-op care.    Except not able to void and no flatus

## 2019-09-27 NOTE — PROGRESS NOTES
Shift assessment completed at this time. Refer to flow sheet for details. Pt is alert and oriented x 4. No acute distress noted. Pt ambulated in hallway about 1/4 lap at this time. Pt has not voided yet since removal of catheter. Will monitor urinary output. Pt c/o abd pain rated 8/10. Will medicate shortly. All safety measures in place. Pt is aware to call for assistance. Call light within reach.

## 2019-09-27 NOTE — PROGRESS NOTES
Bladder scan at this time revealed 395 ml of urine in the bladder. Will initiate urinary cath as per Dr's order.

## 2019-09-30 ENCOUNTER — HOSPITAL ENCOUNTER (EMERGENCY)
Age: 34
Discharge: HOME | End: 2019-09-30
Attending: EMERGENCY MEDICINE
Payer: COMMERCIAL

## 2019-09-30 ENCOUNTER — APPOINTMENT (OUTPATIENT)
Dept: GENERAL RADIOLOGY | Age: 34
End: 2019-09-30
Attending: EMERGENCY MEDICINE
Payer: COMMERCIAL

## 2019-09-30 VITALS
BODY MASS INDEX: 23 KG/M2 | WEIGHT: 125 LBS | HEIGHT: 62 IN | OXYGEN SATURATION: 99 % | TEMPERATURE: 98 F | RESPIRATION RATE: 16 BRPM | DIASTOLIC BLOOD PRESSURE: 60 MMHG | SYSTOLIC BLOOD PRESSURE: 116 MMHG | HEART RATE: 76 BPM

## 2019-09-30 DIAGNOSIS — K59.09 OTHER CONSTIPATION: Primary | ICD-10-CM

## 2019-09-30 PROCEDURE — 74011250637 HC RX REV CODE- 250/637: Performed by: EMERGENCY MEDICINE

## 2019-09-30 PROCEDURE — 99283 EMERGENCY DEPT VISIT LOW MDM: CPT | Performed by: EMERGENCY MEDICINE

## 2019-09-30 PROCEDURE — 74019 RADEX ABDOMEN 2 VIEWS: CPT

## 2019-09-30 RX ORDER — MAGNESIUM CITRATE
148 SOLUTION, ORAL ORAL
Status: COMPLETED | OUTPATIENT
Start: 2019-09-30 | End: 2019-09-30

## 2019-09-30 RX ADMIN — MAGNESIUM CITRATE 148 ML: 1.75 LIQUID ORAL at 18:05

## 2019-09-30 NOTE — ED NOTES
I have reviewed discharge instructions with the patient. The patient verbalized understanding. Patient left ED via Discharge Method: ambulatory to Home with self. Opportunity for questions and clarification provided. Patient given 0 scripts. To continue your aftercare when you leave the hospital, you may receive an automated call from our care team to check in on how you are doing. This is a free service and part of our promise to provide the best care and service to meet your aftercare needs.  If you have questions, or wish to unsubscribe from this service please call [phone]. Thank you for Choosing our [facility].

## 2019-09-30 NOTE — DISCHARGE INSTRUCTIONS
Patient Education        Constipation: Care Instructions  Your Care Instructions    Constipation means that you have a hard time passing stools (bowel movements). People pass stools from 3 times a day to once every 3 days. What is normal for you may be different. Constipation may occur with pain in the rectum and cramping. The pain may get worse when you try to pass stools. Sometimes there are small amounts of bright red blood on toilet paper or the surface of stools. This is because of enlarged veins near the rectum (hemorrhoids). A few changes in your diet and lifestyle may help you avoid ongoing constipation. Your doctor may also prescribe medicine to help loosen your stool. Some medicines can cause constipation. These include pain medicines and antidepressants. Tell your doctor about all the medicines you take. Your doctor may want to make a medicine change to ease your symptoms. Follow-up care is a key part of your treatment and safety. Be sure to make and go to all appointments, and call your doctor if you are having problems. It's also a good idea to know your test results and keep a list of the medicines you take. How can you care for yourself at home? · Drink plenty of fluids, enough so that your urine is light yellow or clear like water. If you have kidney, heart, or liver disease and have to limit fluids, talk with your doctor before you increase the amount of fluids you drink. · Include high-fiber foods in your diet each day. These include fruits, vegetables, beans, and whole grains. · Get at least 30 minutes of exercise on most days of the week. Walking is a good choice. You also may want to do other activities, such as running, swimming, cycling, or playing tennis or team sports. · Take a fiber supplement, such as Citrucel or Metamucil, every day. Read and follow all instructions on the label. · Schedule time each day for a bowel movement. A daily routine may help.  Take your time having your bowel movement. · Support your feet with a small step stool when you sit on the toilet. This helps flex your hips and places your pelvis in a squatting position. · Your doctor may recommend an over-the-counter laxative to relieve your constipation. Examples are Milk of Magnesia and MiraLax. Read and follow all instructions on the label. Do not use laxatives on a long-term basis. When should you call for help? Call your doctor now or seek immediate medical care if:    · You have new or worse belly pain.     · You have new or worse nausea or vomiting.     · You have blood in your stools.    Watch closely for changes in your health, and be sure to contact your doctor if:    · Your constipation is getting worse.     · You do not get better as expected. Where can you learn more? Go to http://robert-flower.info/. Enter 21 [phone] in the search box to learn more about \"Constipation: Care Instructions. \"  Current as of: June 26, 2019  Content Version: 12.2  © 5014-9666 [employer]. Care instructions adapted under license by [employer] (which disclaims liability or warranty for this information). If you have questions about a medical condition or this instruction, always ask your healthcare professional. [place] [address] any warranty or liability for your use of this information. MiraLAX in addition to present medications if no results in the next 24 hours  Mag citrate given in emergency department.   Drink other half of bottle if no results in 3 to 4 hours

## 2019-09-30 NOTE — ED TRIAGE NOTES
Pt had hysterectomy on Thursday. Had urinary catheter removed this morning. Has not had a BM since Wednesday, here for the constipation.  Has only tried stool softeners at home

## 2019-09-30 NOTE — ED PROVIDER NOTES
[facility] [place]    [place] Provider NOTE:   28 yo female unable to have BM for 6 days since lap hysterectomy by Dr Isidra Márquez. Veronica removed this morning for urinary retention and has urinated. Has tried stool softener. No enema or laxatives. Reports rectal pressure, no abdominal pain. Having cold sweats and chills. Appropriate tests ordered. Awaiting room. Brandy Royal MD; 9/30/2019 @3:34 PM===========================================      Had lap hysterectomy and no BM since. Also with retention but Veronica out this AM and voided since    The history is provided by the patient. Constipation    This is a new problem. The current episode started more than 2 days ago. Associated symptoms include flatus and constipation. Pertinent negatives include no dysuria, no abdominal distention, no chills and no fever. Risk factors include immobility. She has tried stimulants for the symptoms. The treatment provided no relief.         Past Medical History:   Diagnosis Date    Anxiety     Depression     Kidney stone     Neuropathy     managed with medication    Other ill-defined conditions(169.80)     kidney stones ovarian cyst    Restless leg syndrome        Past Surgical History:   Procedure Laterality Date    FRAGMENT KIDNEY STONE/ ESWL      HX TUBAL LIGATION  2015    HX UROLOGICAL      lithotripsy    LAP,TUBAL CAUTERY           Family History:   Problem Relation Age of Onset    Breast Cancer Mother     Cancer Mother         cervical    Diabetes Paternal Uncle     Hypertension Maternal Grandmother     Osteoporosis Maternal Grandmother        Social History     Socioeconomic History    Marital status: SINGLE     Spouse name: Not on file    Number of children: Not on file    Years of education: Not on file    Highest education level: Not on file   Occupational History    Not on file   Social Needs    Financial resource strain: Not on file    Food insecurity:     Worry: Not on file     Inability: Not on file    Transportation needs:     Medical: Not on file     Non-medical: Not on file   Tobacco Use    Smoking status: Current Every Day Smoker     Packs/day: 0.25    Smokeless tobacco: Never Used   Substance and Sexual Activity    Alcohol use: Not Currently     Frequency: Never     Comment: socially    Drug use: No    Sexual activity: Yes     Partners: Male     Birth control/protection: Surgical     Comment: tubal   Lifestyle    Physical activity:     Days per week: Not on file     Minutes per session: Not on file    Stress: Not on file   Relationships    Social connections:     Talks on phone: Not on file     Gets together: Not on file     Attends Rastafari service: Not on file     Active member of club or organization: Not on file     Attends meetings of clubs or organizations: Not on file     Relationship status: Not on file    Intimate partner violence:     Fear of current or ex partner: Not on file     Emotionally abused: Not on file     Physically abused: Not on file     Forced sexual activity: Not on file   Other Topics Concern    Not on file   Social History Narrative    Not on file         ALLERGIES: Chantix [varenicline] and Ciprofibrate    Review of Systems   Constitutional: Negative for chills and fever. Gastrointestinal: Positive for constipation and flatus. Negative for abdominal distention. Genitourinary: Negative for dysuria. All other systems reviewed and are negative. There were no vitals filed for this visit. Physical Exam   Constitutional: She appears well-developed and well-nourished. No distress. HENT:   Head: Atraumatic. Eyes: No scleral icterus. Neck: Neck supple. Cardiovascular: Normal rate. Pulmonary/Chest: Effort normal. No respiratory distress. Abdominal: Soft. She exhibits no distension. There is no tenderness. Genitourinary:   Genitourinary Comments: Soft stool with no impaction   Neurological: She is alert.    Skin: Skin is warm and dry. Psychiatric: Thought content normal.   Nursing note and vitals reviewed. MDM  Number of Diagnoses or Management Options  Other constipation:   Diagnosis management comments: Poor/no BM after surgery. Not impacted. Will try mag citrate.  She was given enema option       Amount and/or Complexity of Data Reviewed  Tests in the radiology section of CPT®: reviewed  Independent visualization of images, tracings, or specimens: yes    Risk of Complications, Morbidity, and/or Mortality  Presenting problems: moderate  Diagnostic procedures: low  Management options: moderate    Patient Progress  Patient progress: stable         Procedures

## 2020-02-03 ENCOUNTER — HOSPITAL ENCOUNTER (EMERGENCY)
Age: 35
Discharge: HOME | End: 2020-02-03
Attending: EMERGENCY MEDICINE
Payer: COMMERCIAL

## 2020-02-03 VITALS
HEIGHT: 62 IN | OXYGEN SATURATION: 100 % | WEIGHT: 110 LBS | HEART RATE: 91 BPM | DIASTOLIC BLOOD PRESSURE: 93 MMHG | BODY MASS INDEX: 20.24 KG/M2 | RESPIRATION RATE: 16 BRPM | SYSTOLIC BLOOD PRESSURE: 140 MMHG | TEMPERATURE: 98.6 F

## 2020-02-03 DIAGNOSIS — M54.31 SCIATICA OF RIGHT SIDE: Primary | ICD-10-CM

## 2020-02-03 PROCEDURE — 99283 EMERGENCY DEPT VISIT LOW MDM: CPT

## 2020-02-03 PROCEDURE — 81025 URINE PREGNANCY TEST: CPT

## 2020-02-03 PROCEDURE — 81003 URINALYSIS AUTO W/O SCOPE: CPT

## 2020-02-03 RX ORDER — PREDNISONE 20 MG/1
TABLET ORAL
Qty: 18 TAB | Refills: 0 | Status: SHIPPED | OUTPATIENT
Start: 2020-02-03 | End: 2020-03-02 | Stop reason: ALTCHOICE

## 2020-02-03 NOTE — ED PROVIDER NOTES
Theodore De Jesus is a 36 y.o. female who presents to the ED with a chief complaint of right sided leg pain. She states it starts in her right buttocks and wraps around her leg to the lateral and anterior aspect. She did have similar symptoms back in December. He states she had x-rays done that were negative. When she was pregnant last she was diagnosed with sciatica. She denies any diabetes or other complicating features she did try some meloxicam last time but states she did not get good pain relief. No fever, vomiting, urinary symptoms or weakness. She does have a past history of restless leg syndrome. Past Medical History:   Diagnosis Date    Anxiety     Depression     Kidney stone     Neuropathy     managed with medication    Other ill-defined conditions(249.14)     kidney stones ovarian cyst    Restless leg syndrome        Past Surgical History:   Procedure Laterality Date    FRAGMENT KIDNEY STONE/ ESWL      HX TUBAL LIGATION  2015    HX UROLOGICAL      lithotripsy    LAP,TUBAL CAUTERY           Family History:   Problem Relation Age of Onset    Breast Cancer Mother     Cancer Mother         cervical    Diabetes Paternal Uncle     Hypertension Maternal Grandmother     Osteoporosis Maternal Grandmother        Social History     Socioeconomic History    Marital status: SINGLE     Spouse name: Not on file    Number of children: Not on file    Years of education: Not on file    Highest education level: Not on file   Occupational History    Not on file   Social Needs    Financial resource strain: Not on file    Food insecurity:     Worry: Not on file     Inability: Not on file    Transportation needs:     Medical: Not on file     Non-medical: Not on file   Tobacco Use    Smoking status: Current Every Day Smoker     Packs/day: 0.25    Smokeless tobacco: Never Used   Substance and Sexual Activity    Alcohol use: Yes     Frequency: Never     Comment: socially    Drug use:  No  Sexual activity: Yes     Partners: Male     Birth control/protection: Surgical     Comment: tubal/Hysterectomy   Lifestyle    Physical activity:     Days per week: Not on file     Minutes per session: Not on file    Stress: Not on file   Relationships    Social connections:     Talks on phone: Not on file     Gets together: Not on file     Attends Restoration service: Not on file     Active member of club or organization: Not on file     Attends meetings of clubs or organizations: Not on file     Relationship status: Not on file    Intimate partner violence:     Fear of current or ex partner: Not on file     Emotionally abused: Not on file     Physically abused: Not on file     Forced sexual activity: Not on file   Other Topics Concern    Not on file   Social History Narrative    Not on file         ALLERGIES: Chantix [varenicline] and Ciprofibrate    Review of Systems   Constitutional: Negative for chills, fatigue and fever. Respiratory: Negative for chest tightness, shortness of breath, wheezing and stridor. Cardiovascular: Negative for chest pain and palpitations. Gastrointestinal: Negative for abdominal pain, diarrhea, nausea and vomiting. Genitourinary: Negative for dysuria and urgency. Musculoskeletal: Negative for arthralgias, neck pain and neck stiffness. Skin: Negative for color change, rash and wound. Neurological: Positive for numbness. Negative for weakness. All other systems reviewed and are negative. Vitals:    02/03/20 1708   BP: (!) 140/93   Pulse: 91   Resp: 16   Temp: 98.6 °F (37 °C)   SpO2: 100%   Weight: 49.9 kg (110 lb)   Height: 5' 2\" (1.575 m)            Physical Exam  Vitals signs and nursing note reviewed. Constitutional:       General: She is not in acute distress. Appearance: She is well-developed. HENT:      Head: Normocephalic and atraumatic. Eyes:      General: No scleral icterus.      Conjunctiva/sclera: Conjunctivae normal.   Neck: Musculoskeletal: Normal range of motion. Pulmonary:      Effort: Pulmonary effort is normal. No respiratory distress. Abdominal:      Tenderness: There is no abdominal tenderness. There is no guarding or rebound. Musculoskeletal:      Comments: Pain to the lateral right buttocks. Full range of motion strength intact. Neurological:      General: No focal deficit present. Mental Status: She is alert. Cranial Nerves: No cranial nerve deficit. Motor: No weakness. Psychiatric:         Behavior: Behavior normal.          MDM  Number of Diagnoses or Management Options  Diagnosis management comments: Patient looks well in no distress symptoms consistent with sciatica I am going to treat with steroid taper and give her exercise regiment. Bryce Morales MD; 2/3/2020 @6:34 PM Voice dictation software was used during the making of this note. This software is not perfect and grammatical and other typographical errors may be present.   This note has not been proofread for errors.  ===================================================================            Procedures

## 2020-02-03 NOTE — ED TRIAGE NOTES
Patient reports right outer leg \"burning\" sensation. Denies urinary symptoms or back pain.  History of arthritis in right hip

## 2020-02-03 NOTE — ED NOTES
I have reviewed discharge instructions with the patient. The patient verbalized understanding. Patient left ED via Discharge Method: ambulatory to Home with self. Opportunity for questions and clarification provided. Patient given 1 scripts. To continue your aftercare when you leave the hospital, you may receive an automated call from our care team to check in on how you are doing. This is a free service and part of our promise to provide the best care and service to meet your aftercare needs.  If you have questions, or wish to unsubscribe from this service please call [phone]. Thank you for Choosing our [facility] Emergency Department.

## 2020-02-04 LAB — HCG UR QL: NEGATIVE

## 2020-03-02 PROBLEM — N81.6 RECTOCELE: Status: RESOLVED | Noted: 2019-07-31 | Resolved: 2020-03-02

## 2020-03-02 PROBLEM — N92.0 MENORRHAGIA: Status: RESOLVED | Noted: 2019-09-26 | Resolved: 2020-03-02

## 2020-03-02 PROBLEM — N94.6 SEVERE DYSMENORRHEA: Status: RESOLVED | Noted: 2019-09-26 | Resolved: 2020-03-02

## 2020-03-02 PROBLEM — N92.1 MENORRHAGIA WITH IRREGULAR CYCLE: Status: RESOLVED | Noted: 2018-10-04 | Resolved: 2020-03-02

## 2020-03-02 PROBLEM — Z80.3 FAMILY HISTORY OF BREAST CANCER IN MOTHER: Status: ACTIVE | Noted: 2020-03-02

## 2020-03-02 PROBLEM — N94.6 DYSMENORRHEA: Status: RESOLVED | Noted: 2018-10-04 | Resolved: 2020-03-02

## 2020-03-19 ENCOUNTER — HOSPITAL ENCOUNTER (OUTPATIENT)
Dept: LAB | Age: 35
Discharge: HOME | End: 2020-03-19
Payer: COMMERCIAL

## 2020-03-19 DIAGNOSIS — Z80.3 FAMILY HISTORY OF BREAST CANCER IN MOTHER: ICD-10-CM

## 2020-03-19 LAB
REFERENCE LAB,REFLB: NORMAL
TEST DESCRIPTION:,ATST: NORMAL

## 2020-03-19 PROCEDURE — 36415 COLL VENOUS BLD VENIPUNCTURE: CPT

## 2020-07-06 ENCOUNTER — HOSPITAL ENCOUNTER (EMERGENCY)
Age: 35
Discharge: HOME | End: 2020-07-06
Attending: EMERGENCY MEDICINE
Payer: COMMERCIAL

## 2020-07-06 ENCOUNTER — APPOINTMENT (OUTPATIENT)
Dept: CT IMAGING | Age: 35
End: 2020-07-06
Attending: EMERGENCY MEDICINE
Payer: COMMERCIAL

## 2020-07-06 VITALS
DIASTOLIC BLOOD PRESSURE: 71 MMHG | HEART RATE: 74 BPM | OXYGEN SATURATION: 100 % | WEIGHT: 130 LBS | BODY MASS INDEX: 23.92 KG/M2 | HEIGHT: 62 IN | TEMPERATURE: 98.7 F | SYSTOLIC BLOOD PRESSURE: 105 MMHG | RESPIRATION RATE: 16 BRPM

## 2020-07-06 DIAGNOSIS — R51.9 NONINTRACTABLE HEADACHE, UNSPECIFIED CHRONICITY PATTERN, UNSPECIFIED HEADACHE TYPE: Primary | ICD-10-CM

## 2020-07-06 LAB
ALBUMIN SERPL-MCNC: 3.9 G/DL (ref 3.5–5)
ALBUMIN/GLOB SERPL: 1 {RATIO} (ref 1.2–3.5)
ALP SERPL-CCNC: 110 U/L (ref 50–136)
ALT SERPL-CCNC: 16 U/L (ref 12–65)
ANION GAP SERPL CALC-SCNC: 5 MMOL/L (ref 7–16)
AST SERPL-CCNC: 12 U/L (ref 15–37)
BACTERIA URNS QL MICRO: NORMAL /HPF
BASOPHILS # BLD: 0 K/UL (ref 0–0.2)
BASOPHILS NFR BLD: 0 % (ref 0–2)
BILIRUB SERPL-MCNC: 0.2 MG/DL (ref 0.2–1.1)
BUN SERPL-MCNC: 3 MG/DL (ref 6–23)
CALCIUM SERPL-MCNC: 8.7 MG/DL (ref 8.3–10.4)
CASTS URNS QL MICRO: NORMAL /LPF
CHLORIDE SERPL-SCNC: 106 MMOL/L (ref 98–107)
CO2 SERPL-SCNC: 28 MMOL/L (ref 21–32)
CREAT SERPL-MCNC: 0.74 MG/DL (ref 0.6–1)
DIFFERENTIAL METHOD BLD: NORMAL
EOSINOPHIL # BLD: 0 K/UL (ref 0–0.8)
EOSINOPHIL NFR BLD: 1 % (ref 0.5–7.8)
EPI CELLS #/AREA URNS HPF: NORMAL /HPF
ERYTHROCYTE [DISTWIDTH] IN BLOOD BY AUTOMATED COUNT: 12.2 % (ref 11.9–14.6)
GLOBULIN SER CALC-MCNC: 3.8 G/DL (ref 2.3–3.5)
GLUCOSE SERPL-MCNC: 98 MG/DL (ref 65–100)
HCT VFR BLD AUTO: 40.9 % (ref 35.8–46.3)
HGB BLD-MCNC: 13.2 G/DL (ref 11.7–15.4)
IMM GRANULOCYTES # BLD AUTO: 0 K/UL (ref 0–0.5)
IMM GRANULOCYTES NFR BLD AUTO: 0 % (ref 0–5)
LYMPHOCYTES # BLD: 2.5 K/UL (ref 0.5–4.6)
LYMPHOCYTES NFR BLD: 36 % (ref 13–44)
MCH RBC QN AUTO: 31.1 PG (ref 26.1–32.9)
MCHC RBC AUTO-ENTMCNC: 32.3 G/DL (ref 31.4–35)
MCV RBC AUTO: 96.5 FL (ref 79.6–97.8)
MONOCYTES # BLD: 0.4 K/UL (ref 0.1–1.3)
MONOCYTES NFR BLD: 6 % (ref 4–12)
NEUTS SEG # BLD: 3.8 K/UL (ref 1.7–8.2)
NEUTS SEG NFR BLD: 56 % (ref 43–78)
NRBC # BLD: 0 K/UL (ref 0–0.2)
PLATELET # BLD AUTO: 227 K/UL (ref 150–450)
PMV BLD AUTO: 9.8 FL (ref 9.4–12.3)
POTASSIUM SERPL-SCNC: 3.8 MMOL/L (ref 3.5–5.1)
PROT SERPL-MCNC: 7.7 G/DL (ref 6.3–8.2)
RBC # BLD AUTO: 4.24 M/UL (ref 4.05–5.2)
RBC #/AREA URNS HPF: NORMAL /HPF
SODIUM SERPL-SCNC: 139 MMOL/L (ref 136–145)
WBC # BLD AUTO: 6.8 K/UL (ref 4.3–11.1)
WBC URNS QL MICRO: NORMAL /HPF

## 2020-07-06 PROCEDURE — 85025 COMPLETE CBC W/AUTO DIFF WBC: CPT

## 2020-07-06 PROCEDURE — 96375 TX/PRO/DX INJ NEW DRUG ADDON: CPT

## 2020-07-06 PROCEDURE — 96374 THER/PROPH/DIAG INJ IV PUSH: CPT

## 2020-07-06 PROCEDURE — 74011250636 HC RX REV CODE- 250/636: Performed by: EMERGENCY MEDICINE

## 2020-07-06 PROCEDURE — 99284 EMERGENCY DEPT VISIT MOD MDM: CPT

## 2020-07-06 PROCEDURE — 70450 CT HEAD/BRAIN W/O DYE: CPT

## 2020-07-06 PROCEDURE — 80053 COMPREHEN METABOLIC PANEL: CPT

## 2020-07-06 PROCEDURE — 81003 URINALYSIS AUTO W/O SCOPE: CPT

## 2020-07-06 PROCEDURE — 81015 MICROSCOPIC EXAM OF URINE: CPT

## 2020-07-06 RX ORDER — ONDANSETRON 8 MG/1
8 TABLET, ORALLY DISINTEGRATING ORAL
Qty: 10 TAB | Refills: 0 | Status: SHIPPED | OUTPATIENT
Start: 2020-07-06 | End: 2020-07-28

## 2020-07-06 RX ORDER — DIPHENHYDRAMINE HYDROCHLORIDE 50 MG/ML
25 INJECTION, SOLUTION INTRAMUSCULAR; INTRAVENOUS
Status: COMPLETED | OUTPATIENT
Start: 2020-07-06 | End: 2020-07-06

## 2020-07-06 RX ORDER — METOCLOPRAMIDE HYDROCHLORIDE 5 MG/ML
10 INJECTION INTRAMUSCULAR; INTRAVENOUS
Status: COMPLETED | OUTPATIENT
Start: 2020-07-06 | End: 2020-07-06

## 2020-07-06 RX ORDER — BUTALBITAL, ACETAMINOPHEN AND CAFFEINE 300; 40; 50 MG/1; MG/1; MG/1
1 CAPSULE ORAL
Qty: 8 CAP | Refills: 0 | Status: SHIPPED | OUTPATIENT
Start: 2020-07-06 | End: 2020-07-08

## 2020-07-06 RX ADMIN — SODIUM CHLORIDE 1000 ML: 9 INJECTION, SOLUTION INTRAVENOUS at 14:07

## 2020-07-06 RX ADMIN — METOCLOPRAMIDE 10 MG: 5 INJECTION, SOLUTION INTRAMUSCULAR; INTRAVENOUS at 14:07

## 2020-07-06 RX ADMIN — DIPHENHYDRAMINE HYDROCHLORIDE 25 MG: 50 INJECTION, SOLUTION INTRAMUSCULAR; INTRAVENOUS at 14:07

## 2020-07-06 NOTE — ED TRIAGE NOTES
Patient arrives ambulatory to triage with mask in place. Patient reports having a migraine every day since May. Patient reports vomiting after she eats meals. Patient reports she checks her blood sugar because she lives with a diabetic, bgl 90. Patient is not a diabetic. Patient reports she used to take imitrex, but it does not work. Patient reports she is currently in pain management due to back pain and restless leg syndrome.

## 2020-07-06 NOTE — ED NOTES
Pt states \"I've had a headache every day since May; I throw up and get dizzy every day now too\". Has never had a head CT for this. Pt concerned that she has diabetes; checks her bgl every day w/ family member's glucometer; states is always around 90 mg/dl. Pt anxious; stopped taking her Zoloft x 3 days but resumed taking it again yesterday. Report hx of migraines; used to take Imitrex but now \"It doesn't work\".

## 2020-07-06 NOTE — DISCHARGE INSTRUCTIONS

## 2020-07-06 NOTE — ED PROVIDER NOTES
29year-old white female with history of chronic back pain, interstitial cystitis and \"neuropathy\" presents with headaches which occur daily for the past 2 months. She says some days the headache last 30 minutes and other days it can last for half a day. Location of pain is variable. She does report daily nausea and vomiting that usually occurs with intake. He states that she has been diagnosed with migraines in the past and has been on Imitrex but states that this no longer works. No fever. No vision changes. Patient is also concerned that she may have diabetes however she states that every time they checked her A1c she is told that it was normal.    The history is provided by the patient. Headache    Associated symptoms include nausea and vomiting. Pertinent negatives include no fever and no shortness of breath.         Past Medical History:   Diagnosis Date    Anxiety     Depression     Dysmenorrhea 10/4/2018    IC (interstitial cystitis)     Kidney stone     Menorrhagia 9/26/2019    Menorrhagia with irregular cycle 10/4/2018    Neuropathy     managed with medication    Other ill-defined conditions(799.89)     kidney stones ovarian cyst    Rectocele 7/31/2019    Restless leg syndrome     Severe dysmenorrhea 9/26/2019       Past Surgical History:   Procedure Laterality Date    FRAGMENT KIDNEY STONE/ ESWL      HX HYSTERECTOMY  09/29/2019    LAVH/BS    HX TUBAL LIGATION  2015    HX UROLOGICAL      lithotripsy    LAP,TUBAL CAUTERY           Family History:   Problem Relation Age of Onset    Breast Cancer Mother     Cancer Mother         cervical    Diabetes Paternal Uncle     Hypertension Maternal Grandmother     Osteoporosis Maternal Grandmother        Social History     Socioeconomic History    Marital status: SINGLE     Spouse name: Not on file    Number of children: Not on file    Years of education: Not on file    Highest education level: Not on file   Occupational History    Not on file   Social Needs    Financial resource strain: Not on file    Food insecurity     Worry: Not on file     Inability: Not on file    Transportation needs     Medical: Not on file     Non-medical: Not on file   Tobacco Use    Smoking status: Current Every Day Smoker     Packs/day: 0.25    Smokeless tobacco: Never Used   Substance and Sexual Activity    Alcohol use: Yes     Frequency: Never     Comment: socially    Drug use: No    Sexual activity: Yes     Partners: Male     Birth control/protection: Surgical     Comment: tubal/Hysterectomy   Lifestyle    Physical activity     Days per week: Not on file     Minutes per session: Not on file    Stress: Not on file   Relationships    Social connections     Talks on phone: Not on file     Gets together: Not on file     Attends Catholic service: Not on file     Active member of club or organization: Not on file     Attends meetings of clubs or organizations: Not on file     Relationship status: Not on file    Intimate partner violence     Fear of current or ex partner: Not on file     Emotionally abused: Not on file     Physically abused: Not on file     Forced sexual activity: Not on file   Other Topics Concern    Not on file   Social History Narrative    Not on file         ALLERGIES: Chantix [varenicline] and Ciprofibrate    Review of Systems   Constitutional: Negative for fever. HENT: Negative for congestion. Respiratory: Negative for cough and shortness of breath. Cardiovascular: Negative for chest pain. Gastrointestinal: Positive for nausea and vomiting. Negative for abdominal pain. Genitourinary: Negative for dysuria. Musculoskeletal: Positive for back pain. Negative for neck pain. Skin: Negative for rash. Neurological: Positive for headaches.        Vitals:    07/06/20 1326   BP: 114/67   Pulse: 84   Resp: 16   Temp: 98.7 °F (37.1 °C)   SpO2: 97%   Weight: 59 kg (130 lb)   Height: 5' 2\" (1.575 m)            Physical Exam  Vitals signs and nursing note reviewed. Constitutional:       General: She is not in acute distress. Appearance: Normal appearance. She is not toxic-appearing. HENT:      Head: Normocephalic and atraumatic. Mouth/Throat:      Mouth: Mucous membranes are moist.      Pharynx: Oropharynx is clear. Eyes:      Extraocular Movements: Extraocular movements intact. Conjunctiva/sclera: Conjunctivae normal.      Pupils: Pupils are equal, round, and reactive to light. Neck:      Musculoskeletal: Normal range of motion. Cardiovascular:      Rate and Rhythm: Normal rate and regular rhythm. Pulmonary:      Effort: Pulmonary effort is normal.      Breath sounds: Normal breath sounds. Abdominal:      General: There is no distension. Palpations: Abdomen is soft. Tenderness: There is no abdominal tenderness. There is no guarding. Musculoskeletal: Normal range of motion. General: No swelling. Skin:     General: Skin is warm and dry. Neurological:      General: No focal deficit present. Mental Status: She is alert and oriented to person, place, and time. Cranial Nerves: No cranial nerve deficit. Coordination: Coordination normal.      Gait: Gait normal.   Psychiatric:         Mood and Affect: Mood normal.         Behavior: Behavior normal.          MDM  Number of Diagnoses or Management Options  Nonintractable headache, unspecified chronicity pattern, unspecified headache type:   Diagnosis management comments: Head CT no acute abnormality. Lab work normal.  Patient treated with IV fluids, Reglan and Benadryl. Leg headache has improved. Etiology for headaches not clear but at this time she has normal neuro exam and is nontoxic. Will discharge home with Fioricet and is advised to follow-up with primary care.        Amount and/or Complexity of Data Reviewed  Clinical lab tests: ordered and reviewed  Tests in the radiology section of CPT®: ordered and reviewed  Tests in the medicine section of CPT®: ordered and reviewed    Risk of Complications, Morbidity, and/or Mortality  Presenting problems: moderate  Diagnostic procedures: moderate  Management options: moderate           Procedures

## 2020-07-06 NOTE — ED NOTES
I have reviewed discharge instructions with the patient. The patient verbalized understanding. Patient left ED via Discharge Method: ambulatory to Home with her grandmother. Opportunity for questions and clarification provided. Patient given 2 scripts. To continue your aftercare when you leave the hospital, you may receive an automated call from our care team to check in on how you are doing.  This is a free service and part of our promise to provide the best care and service to meet your aftercare needs. \" If you have questions, or wish to unsubscribe from this service please call [phone].  Thank you for Choosing our [facility] Emergency Department.

## 2021-08-23 ENCOUNTER — HOSPITAL ENCOUNTER (OUTPATIENT)
Dept: CT IMAGING | Age: 36
Discharge: HOME | End: 2021-08-23
Attending: NURSE PRACTITIONER
Payer: COMMERCIAL

## 2021-08-23 DIAGNOSIS — R10.9 FLANK PAIN: ICD-10-CM

## 2021-08-23 PROCEDURE — 74176 CT ABD & PELVIS W/O CONTRAST: CPT

## 2022-03-18 PROBLEM — R35.0 URINARY FREQUENCY: Status: ACTIVE | Noted: 2019-02-25

## 2022-03-19 PROBLEM — Z80.3 FAMILY HISTORY OF BREAST CANCER IN MOTHER: Status: ACTIVE | Noted: 2020-03-02

## 2022-03-19 PROBLEM — R39.89 BLADDER PAIN: Status: ACTIVE | Noted: 2018-10-04

## 2022-03-19 PROBLEM — N94.89 HIGH-TONE PELVIC FLOOR DYSFUNCTION: Status: ACTIVE | Noted: 2018-10-04

## 2022-03-19 PROBLEM — M62.89 HIGH-TONE PELVIC FLOOR DYSFUNCTION: Status: ACTIVE | Noted: 2018-10-04

## 2022-03-19 PROBLEM — N30.10 INTERSTITIAL CYSTITIS: Status: ACTIVE | Noted: 2019-07-09

## 2022-12-21 ENCOUNTER — OFFICE VISIT (OUTPATIENT)
Dept: UROLOGY | Age: 37
End: 2022-12-21
Payer: MEDICAID

## 2022-12-21 DIAGNOSIS — N30.10 INTERSTITIAL CYSTITIS (CHRONIC) WITHOUT HEMATURIA: ICD-10-CM

## 2022-12-21 DIAGNOSIS — N20.0 RENAL STONE: Primary | ICD-10-CM

## 2022-12-21 DIAGNOSIS — N20.1 RIGHT URETERAL STONE: ICD-10-CM

## 2022-12-21 LAB
BILIRUBIN, URINE, POC: NEGATIVE
BLOOD URINE, POC: NORMAL
GLUCOSE URINE, POC: NEGATIVE
KETONES, URINE, POC: NEGATIVE
LEUKOCYTE ESTERASE, URINE, POC: NEGATIVE
NITRITE, URINE, POC: NEGATIVE
PH, URINE, POC: 6 (ref 4.6–8)
PROTEIN,URINE, POC: NEGATIVE
SPECIFIC GRAVITY, URINE, POC: 1 (ref 1–1.03)
URINALYSIS CLARITY, POC: NORMAL
URINALYSIS COLOR, POC: NORMAL
UROBILINOGEN, POC: NORMAL

## 2022-12-21 PROCEDURE — 81003 URINALYSIS AUTO W/O SCOPE: CPT | Performed by: NURSE PRACTITIONER

## 2022-12-21 PROCEDURE — 99214 OFFICE O/P EST MOD 30 MIN: CPT | Performed by: NURSE PRACTITIONER

## 2022-12-21 RX ORDER — ONDANSETRON 4 MG/1
4 TABLET, FILM COATED ORAL DAILY PRN
Qty: 30 TABLET | Refills: 0 | Status: SHIPPED | OUTPATIENT
Start: 2022-12-21

## 2022-12-21 RX ORDER — HYDROCODONE BITARTRATE AND ACETAMINOPHEN 5; 325 MG/1; MG/1
1 TABLET ORAL EVERY 6 HOURS PRN
Qty: 20 TABLET | Refills: 0 | Status: SHIPPED | OUTPATIENT
Start: 2022-12-21 | End: 2022-12-26

## 2022-12-21 RX ORDER — TAMSULOSIN HYDROCHLORIDE 0.4 MG/1
0.4 CAPSULE ORAL
Qty: 30 CAPSULE | Refills: 0 | Status: SHIPPED | OUTPATIENT
Start: 2022-12-21

## 2022-12-21 ASSESSMENT — ENCOUNTER SYMPTOMS
ABDOMINAL PAIN: 0
BACK PAIN: 0

## 2022-12-21 NOTE — PROGRESS NOTES
[facility]  [address]  [address], [address]  [phone]          Amaris Rebolledo  : 1985    Chief Complaint   Patient presents with    Follow-up     Kidney stone            HPI     Amaris Rebolledo is a 40 y.o. female with h/o IC. CMG showed no OAB. ? IC. S/P cysto/hydrodistention showed IC. Continues to have mild intermittent bladder pain. Has found a trigger with [facility]. Has been drinking more water and less [facility]. This has helped improve urgency and frequency of urination. She was started on amitriptyline. She had significant improvement with this. However, since going up to 50 mg on the medication, she has had worsening urinary frequency and pelvic pressure. She was found to have incomplete bladder emptying with this. Medication stopped. She opted for interstim trial. She reported > 50 % improvement with this. Significant reduction in frequency and urgency. ?relief of bloating. She was due to have permanent placement but did not go through w this due to other health issues. She was seen in  for back pain. H/O kidney stones. KUB was negative. CT urogram obtained and showed punctate B renal stones. No obstruction. She had abrupt onset of R sided flank pain one week ago. Seen at urgent care. Passed a stone while at urgent care. UA wo infection. She is here today for follow up. Having intermittent R flank pain. Feels like she may be passing another stone. No fever/chills, n/v, gross hematuria, or LUTS.       Past Medical History:   Diagnosis Date    Anxiety     Depression     Dysmenorrhea 10/4/2018    IC (interstitial cystitis)     Kidney stone     Menorrhagia 2019    Menorrhagia with irregular cycle 10/4/2018    Neuropathy     managed with medication    Other ill-defined conditions(799.89)     kidney stones ovarian cyst    Rectocele 2019    Restless leg syndrome     Severe dysmenorrhea 2019     Past Surgical History:   Procedure Laterality Date    FRAGMENT KIDNEY STONE/ ESWL      HYSTERECTOMY (CERVIX STATUS UNKNOWN)  09/29/2019    LAVH/BS-Ovaries intact    LAP,TUBAL CAUTERY      TUBAL LIGATION  2015    UROLOGICAL SURGERY      lithotripsy     Current Outpatient Medications   Medication Sig Dispense Refill    HYDROcodone-acetaminophen (NORCO) 5-325 MG per tablet Take 1 tablet by mouth every 6 hours as needed for Pain for up to 5 days. Intended supply: 5 days. Take lowest dose possible to manage pain Max Daily Amount: 4 tablets 20 tablet 0    ondansetron (ZOFRAN) 4 MG tablet Take 1 tablet by mouth daily as needed for Nausea or Vomiting 30 tablet 0    tamsulosin (FLOMAX) 0.4 MG capsule Take 1 capsule by mouth nightly 30 capsule 0    amitriptyline (ELAVIL) 25 MG tablet Take 25 mg by mouth      busPIRone (BUSPAR) 15 MG tablet Buspirone 15 mg tablet Take 1 tablet twice a day by oral route for 30 days. celecoxib (CELEBREX) 200 MG capsule Take 200 mg by mouth daily      cyclobenzaprine (FLEXERIL) 10 MG tablet Take by mouth 3 times daily as needed      rOPINIRole (REQUIP) 1 MG tablet Take 1 mg by mouth      sertraline (ZOLOFT) 100 MG tablet TAKE 1 TABLET BY MOUTH DAILY       No current facility-administered medications for this visit.      Allergies   Allergen Reactions    Ciprofibrate Hives    Varenicline Other (See Comments)     hallucinations     Social History     Socioeconomic History    Marital status: Single     Spouse name: Not on file    Number of children: Not on file    Years of education: Not on file    Highest education level: Not on file   Occupational History    Not on file   Tobacco Use    Smoking status: Every Day     Packs/day: 0.25     Types: Cigarettes    Smokeless tobacco: Never   Substance and Sexual Activity    Alcohol use: Yes    Drug use: No    Sexual activity: Not on file     Comment: tubal/Hysterectomy   Other Topics Concern    Not on file   Social History Narrative    Not on file     Social stone  N20.1       3. Interstitial cystitis (chronic) without hematuria  N30.10           Renal stone/R ureteral stone- urine normal. KUB reviewed. ?passing stone. She opts to HOLD on CT at this time. Will treat w Flomax 0.4 mg PO daily. Risks, benefits, and alternatives reviewed. Provided Norco 5/325 mg PO q 6 hr PRN pain and Zofran 4 mg PO q 8 hr PRN n/v.     RTO in 2 weeks for follow up with KUB. Advised to call sooner if sx worsen. IC- stable. No add tx. Alea Bowles, FNP, APRN - CNP  Dr. Mark Joshi is supervising physician today and he approves plan of care.

## 2023-01-04 ENCOUNTER — OFFICE VISIT (OUTPATIENT)
Dept: UROLOGY | Age: 38
End: 2023-01-04
Payer: COMMERCIAL

## 2023-01-04 ENCOUNTER — HOSPITAL ENCOUNTER (OUTPATIENT)
Dept: CT IMAGING | Age: 38
Discharge: HOME | End: 2023-01-07
Payer: COMMERCIAL

## 2023-01-04 DIAGNOSIS — N20.0 RENAL STONE: Primary | ICD-10-CM

## 2023-01-04 DIAGNOSIS — R10.9 LEFT FLANK PAIN: ICD-10-CM

## 2023-01-04 DIAGNOSIS — N20.0 RENAL STONE: ICD-10-CM

## 2023-01-04 LAB
BILIRUBIN, URINE, POC: NEGATIVE
BLOOD URINE, POC: ABNORMAL
GLUCOSE URINE, POC: NEGATIVE
KETONES, URINE, POC: NEGATIVE
LEUKOCYTE ESTERASE, URINE, POC: NEGATIVE
NITRITE, URINE, POC: NEGATIVE
PH, URINE, POC: 8.5 (ref 4.6–8)
PROTEIN,URINE, POC: NEGATIVE
SPECIFIC GRAVITY, URINE, POC: 1.02 (ref 1–1.03)
URINALYSIS CLARITY, POC: ABNORMAL
URINALYSIS COLOR, POC: ABNORMAL
UROBILINOGEN, POC: ABNORMAL

## 2023-01-04 PROCEDURE — 74176 CT ABD & PELVIS W/O CONTRAST: CPT

## 2023-01-04 PROCEDURE — 99214 OFFICE O/P EST MOD 30 MIN: CPT | Performed by: NURSE PRACTITIONER

## 2023-01-04 PROCEDURE — 81003 URINALYSIS AUTO W/O SCOPE: CPT | Performed by: NURSE PRACTITIONER

## 2023-01-04 RX ORDER — KETOROLAC TROMETHAMINE 30 MG/ML
30 INJECTION, SOLUTION INTRAMUSCULAR; INTRAVENOUS ONCE
Status: COMPLETED | OUTPATIENT
Start: 2023-01-04 | End: 2023-01-04

## 2023-01-04 RX ADMIN — KETOROLAC TROMETHAMINE 30 MG: 30 INJECTION, SOLUTION INTRAMUSCULAR; INTRAVENOUS at 14:23

## 2023-01-04 ASSESSMENT — ENCOUNTER SYMPTOMS
BACK PAIN: 0
ABDOMINAL PAIN: 0

## 2023-01-06 ENCOUNTER — TELEPHONE (OUTPATIENT)
Dept: UROLOGY | Age: 38
End: 2023-01-06

## 2023-01-06 DIAGNOSIS — N20.0 RENAL STONE: Primary | ICD-10-CM

## 2023-01-06 RX ORDER — HYDROCODONE BITARTRATE AND ACETAMINOPHEN 5; 325 MG/1; MG/1
1 TABLET ORAL EVERY 6 HOURS PRN
Qty: 20 TABLET | Refills: 0 | Status: SHIPPED | OUTPATIENT
Start: 2023-01-06 | End: 2023-01-11

## 2023-01-06 NOTE — TELEPHONE ENCOUNTER
Patient called and LVM stating CT frm 1/4/22 shows B/L kidney stones and she just passed one this am. Requesting more pain medication

## 2023-01-12 ENCOUNTER — NURSE ONLY (OUTPATIENT)
Dept: UROLOGY | Age: 38
End: 2023-01-12

## 2023-01-12 DIAGNOSIS — N20.0 RENAL STONE: Primary | ICD-10-CM

## 2023-01-13 LAB
ANION GAP SERPL CALC-SCNC: 4 MMOL/L (ref 2–11)
BUN SERPL-MCNC: 8 MG/DL (ref 6–23)
CALCIUM SERPL-MCNC: 10.4 MG/DL (ref 8.3–10.4)
CALCIUM SERPL-MCNC: 10.9 MG/DL (ref 8.3–10.4)
CHLORIDE SERPL-SCNC: 107 MMOL/L (ref 101–110)
CO2 SERPL-SCNC: 26 MMOL/L (ref 21–32)
CREAT SERPL-MCNC: 0.8 MG/DL (ref 0.6–1)
GLUCOSE SERPL-MCNC: 46 MG/DL (ref 65–100)
POTASSIUM SERPL-SCNC: 3.9 MMOL/L (ref 3.5–5.1)
PTH-INTACT SERPL-MCNC: 16.5 PG/ML (ref 18.5–88)
SODIUM SERPL-SCNC: 137 MMOL/L (ref 133–143)
URATE SERPL-MCNC: 4.1 MG/DL (ref 2.6–6)

## 2023-01-14 DIAGNOSIS — N20.0 RENAL STONE: Primary | ICD-10-CM

## 2023-01-14 DIAGNOSIS — R79.89 LOW SERUM PARATHYROID HORMONE (PTH): ICD-10-CM

## 2023-01-14 DIAGNOSIS — E83.52 HIGH CALCIUM LEVELS: ICD-10-CM

## 2023-01-26 ENCOUNTER — TELEPHONE (OUTPATIENT)
Dept: UROLOGY | Age: 38
End: 2023-01-26

## 2023-04-26 ENCOUNTER — OFFICE VISIT (OUTPATIENT)
Dept: UROLOGY | Age: 38
End: 2023-04-26
Payer: COMMERCIAL

## 2023-04-26 DIAGNOSIS — N20.0 RENAL STONE: Primary | ICD-10-CM

## 2023-04-26 DIAGNOSIS — N20.1 RIGHT URETERAL STONE: ICD-10-CM

## 2023-04-26 DIAGNOSIS — N30.00 ACUTE CYSTITIS WITHOUT HEMATURIA: ICD-10-CM

## 2023-04-26 LAB
BILIRUBIN, URINE, POC: NEGATIVE
BLOOD URINE, POC: NORMAL
GLUCOSE URINE, POC: NEGATIVE
KETONES, URINE, POC: NEGATIVE
LEUKOCYTE ESTERASE, URINE, POC: NEGATIVE
NITRITE, URINE, POC: NEGATIVE
PH, URINE, POC: 5.5 (ref 4.6–8)
PROTEIN,URINE, POC: NEGATIVE
SPECIFIC GRAVITY, URINE, POC: 1 (ref 1–1.03)
URINALYSIS CLARITY, POC: NORMAL
URINALYSIS COLOR, POC: NORMAL
UROBILINOGEN, POC: NORMAL

## 2023-04-26 PROCEDURE — 81003 URINALYSIS AUTO W/O SCOPE: CPT | Performed by: NURSE PRACTITIONER

## 2023-04-26 PROCEDURE — 74018 RADEX ABDOMEN 1 VIEW: CPT | Performed by: NURSE PRACTITIONER

## 2023-04-26 PROCEDURE — 99214 OFFICE O/P EST MOD 30 MIN: CPT | Performed by: NURSE PRACTITIONER

## 2023-04-26 RX ORDER — OXYCODONE HYDROCHLORIDE AND ACETAMINOPHEN 5; 325 MG/1; MG/1
1 TABLET ORAL EVERY 6 HOURS PRN
Qty: 20 TABLET | Refills: 0 | Status: SHIPPED | OUTPATIENT
Start: 2023-04-26 | End: 2023-05-01

## 2023-04-26 ASSESSMENT — ENCOUNTER SYMPTOMS
VOMITING: 1
NAUSEA: 1
BACK PAIN: 1

## 2023-04-26 NOTE — PROGRESS NOTES
HYSTERECTOMY (CERVIX STATUS UNKNOWN)  09/29/2019    LAVH/BS-Ovaries intact    LAP,TUBAL CAUTERY      TUBAL LIGATION  2015    UROLOGICAL SURGERY      lithotripsy     Current Outpatient Medications   Medication Sig Dispense Refill    oxyCODONE-acetaminophen (PERCOCET) 5-325 MG per tablet Take 1 tablet by mouth every 6 hours as needed for Pain for up to 5 days. Max Daily Amount: 4 tablets 20 tablet 0    ondansetron (ZOFRAN) 4 MG tablet Take 1 tablet by mouth daily as needed for Nausea or Vomiting 30 tablet 0    tamsulosin (FLOMAX) 0.4 MG capsule Take 1 capsule by mouth nightly 30 capsule 0    cyclobenzaprine (FLEXERIL) 10 MG tablet Take by mouth 3 times daily as needed      rOPINIRole (REQUIP) 1 MG tablet Take 1 tablet by mouth       No current facility-administered medications for this visit.      Allergies   Allergen Reactions    Ciprofibrate Hives    Varenicline Other (See Comments)     hallucinations     Social History     Socioeconomic History    Marital status: Single     Spouse name: Not on file    Number of children: Not on file    Years of education: Not on file    Highest education level: Not on file   Occupational History    Not on file   Tobacco Use    Smoking status: Every Day     Packs/day: 0.25     Types: Cigarettes    Smokeless tobacco: Never   Substance and Sexual Activity    Alcohol use: Yes    Drug use: No    Sexual activity: Not on file     Comment: tubal/Hysterectomy   Other Topics Concern    Not on file   Social History Narrative    Not on file     Social Determinants of Health     Financial Resource Strain: Not on file   Food Insecurity: Not on file   Transportation Needs: Not on file   Physical Activity: Not on file   Stress: Not on file   Social Connections: Not on file   Intimate Partner Violence: Not on file   Housing Stability: Not on file     Family History   Problem Relation Age of Onset    Osteoporosis Maternal Grandmother     Hypertension Maternal Grandmother     Diabetes Paternal Uncle

## 2023-05-31 ENCOUNTER — OFFICE VISIT (OUTPATIENT)
Dept: ENDOCRINOLOGY | Age: 38
End: 2023-05-31
Payer: COMMERCIAL

## 2023-05-31 VITALS
BODY MASS INDEX: 18.66 KG/M2 | SYSTOLIC BLOOD PRESSURE: 100 MMHG | DIASTOLIC BLOOD PRESSURE: 80 MMHG | OXYGEN SATURATION: 96 % | WEIGHT: 102 LBS | HEART RATE: 72 BPM

## 2023-05-31 DIAGNOSIS — E83.52 HYPERCALCEMIA: Primary | ICD-10-CM

## 2023-05-31 DIAGNOSIS — N20.0 KIDNEY STONES: ICD-10-CM

## 2023-05-31 DIAGNOSIS — E83.52 HYPERCALCEMIA: ICD-10-CM

## 2023-05-31 DIAGNOSIS — R79.89 LOW SERUM PARATHYROID HORMONE (PTH): ICD-10-CM

## 2023-05-31 LAB
25(OH)D3 SERPL-MCNC: 21.6 NG/ML (ref 30–100)
ALBUMIN SERPL-MCNC: 4.5 G/DL (ref 3.5–5)
ALBUMIN/GLOB SERPL: 1.4 (ref 0.4–1.6)
ALP SERPL-CCNC: 83 U/L (ref 50–136)
ALT SERPL-CCNC: 17 U/L (ref 12–65)
ANION GAP SERPL CALC-SCNC: 7 MMOL/L (ref 2–11)
AST SERPL-CCNC: 11 U/L (ref 15–37)
BILIRUB SERPL-MCNC: 0.4 MG/DL (ref 0.2–1.1)
BUN SERPL-MCNC: 6 MG/DL (ref 6–23)
CALCIUM SERPL-MCNC: 9.7 MG/DL (ref 8.3–10.4)
CHLORIDE SERPL-SCNC: 104 MMOL/L (ref 101–110)
CO2 SERPL-SCNC: 27 MMOL/L (ref 21–32)
CREAT SERPL-MCNC: 0.8 MG/DL (ref 0.6–1)
GLOBULIN SER CALC-MCNC: 3.3 G/DL (ref 2.8–4.5)
GLUCOSE SERPL-MCNC: 90 MG/DL (ref 65–100)
PHOSPHATE SERPL-MCNC: 3.1 MG/DL (ref 2.5–4.5)
POTASSIUM SERPL-SCNC: 3.8 MMOL/L (ref 3.5–5.1)
PROT SERPL-MCNC: 7.8 G/DL (ref 6.3–8.2)
SODIUM SERPL-SCNC: 138 MMOL/L (ref 133–143)

## 2023-05-31 PROCEDURE — 99204 OFFICE O/P NEW MOD 45 MIN: CPT | Performed by: INTERNAL MEDICINE

## 2023-05-31 RX ORDER — OXYCODONE HYDROCHLORIDE AND ACETAMINOPHEN 5; 325 MG/1; MG/1
TABLET ORAL
COMMUNITY

## 2023-05-31 ASSESSMENT — ENCOUNTER SYMPTOMS
COUGH: 1
CONSTIPATION: 0
BLOOD IN STOOL: 0
DIARRHEA: 0

## 2023-05-31 NOTE — PROGRESS NOTES
Ho Hoffman MD, [facility]  [address], [address] [facility], [address] [place] [address]  Phone [phone]  Facsimile [phone]          Terrance Vargas is a 40 y.o. female seen 5/31/2023 at the request of OPHELIA Martinez for the evaluation of renal stone, high calcium levels and low serum parathyroid hormone        ASSESSMENT AND PLAN:    1. Hypercalcemia  She was noted to have a mildly elevated serum calcium level in the setting of a low parathyroid hormone level, consistent with parathyroid hormone-independent hypercalcemia. Her labs were not typical of primary hyperparathyroidism, which is what we generally see more commonly as endocrinologists. The differential diagnosis for parathyroid hormone-independent hypercalcemia is quite broad and I will perform the below work-up. Since she does not appear to have primary hyperparathyroidism, I may defer further work-up to her primary care physician once the below laboratory studies have returned. - Comprehensive Metabolic Panel; Future  - Calcium, Ionized; Future  - Phosphorus; Future  - PTH, Intact; Future  - Vitamin D 25 Hydroxy; Future  - Vitamin D 1,25 Dihydroxy; Future  - PTH-Related Peptide; Future  - Vitamin A; Future  - Angiotensin Converting Enzyme; Future  - Electrophoresis Protein, Serum; Future    2. Low serum parathyroid hormone (PTH)  See above discussion. 3. Kidney stones  Followed by urology. Follow-up and Dispositions    Return To be determined. HISTORY OF PRESENT ILLNESS:    HYPERCALCEMIA    Presentation/Diagnosis: Calcium and parathyroid hormone level ordered during the evaluation of kidney stones. Risk Factors:  Denies the use of calcium, vitamin D, thiazides, lithium (she has a history of bipolar disorder). No family history of hypercalcemia, hyperparathyroidism, multiple endocrine neoplasia. Symptoms: She has a history of kidney stones followed by urology.

## 2023-06-01 LAB
1,25(OH)2D3 SERPL-MCNC: 46.3 PG/ML (ref 24.8–81.5)
ACE SERPL-CCNC: 36 U/L (ref 14–82)
ALBUMIN SERPL ELPH-MCNC: 4.6 G/DL (ref 2.9–4.4)
ALBUMIN/GLOB SERPL: 1.6 (ref 0.7–1.7)
ALPHA1 GLOB SERPL ELPH-MCNC: 0.2 G/DL (ref 0–0.4)
ALPHA2 GLOB SERPL ELPH-MCNC: 0.8 G/DL (ref 0.4–1)
B-GLOBULIN SERPL ELPH-MCNC: 1 G/DL (ref 0.7–1.3)
CA-I SERPL ISE-MCNC: 5.2 MG/DL (ref 4.5–5.6)
CALCIUM SERPL-MCNC: 9.5 MG/DL (ref 8.3–10.4)
GAMMA GLOB SERPL ELPH-MCNC: 0.9 G/DL (ref 0.4–1.8)
GLOBULIN SER CALC-MCNC: 2.9 G/DL (ref 2.2–3.9)
M PROTEIN SERPL ELPH-MCNC: ABNORMAL G/DL
PROT SERPL-MCNC: 7.5 G/DL (ref 6–8.5)
PTH-INTACT SERPL-MCNC: 30 PG/ML (ref 18.5–88)

## 2023-06-02 ENCOUNTER — TELEPHONE (OUTPATIENT)
Dept: ENDOCRINOLOGY | Age: 38
End: 2023-06-02

## 2023-06-02 LAB — VIT A SERPL-MCNC: 33.3 UG/DL (ref 18.9–57.3)

## 2023-06-06 DIAGNOSIS — N20.0 RENAL STONE: Primary | ICD-10-CM

## 2023-06-06 LAB — PTH RELATED PROT SERPL-SCNC: <2 PMOL/L

## 2023-06-06 RX ORDER — OXYCODONE HYDROCHLORIDE AND ACETAMINOPHEN 5; 325 MG/1; MG/1
1 TABLET ORAL EVERY 6 HOURS PRN
Qty: 20 TABLET | Refills: 0 | Status: SHIPPED | OUTPATIENT
Start: 2023-06-06 | End: 2023-06-11

## 2023-09-05 SDOH — ECONOMIC STABILITY: HOUSING INSECURITY
IN THE LAST 12 MONTHS, WAS THERE A TIME WHEN YOU DID NOT HAVE A STEADY PLACE TO SLEEP OR SLEPT IN A SHELTER (INCLUDING NOW)?: NO

## 2023-09-05 SDOH — ECONOMIC STABILITY: FOOD INSECURITY: WITHIN THE PAST 12 MONTHS, THE FOOD YOU BOUGHT JUST DIDN'T LAST AND YOU DIDN'T HAVE MONEY TO GET MORE.: NEVER TRUE

## 2023-09-05 SDOH — ECONOMIC STABILITY: TRANSPORTATION INSECURITY
IN THE PAST 12 MONTHS, HAS LACK OF TRANSPORTATION KEPT YOU FROM MEETINGS, WORK, OR FROM GETTING THINGS NEEDED FOR DAILY LIVING?: NO

## 2023-09-05 SDOH — ECONOMIC STABILITY: INCOME INSECURITY: HOW HARD IS IT FOR YOU TO PAY FOR THE VERY BASICS LIKE FOOD, HOUSING, MEDICAL CARE, AND HEATING?: NOT HARD AT ALL

## 2023-09-05 SDOH — ECONOMIC STABILITY: FOOD INSECURITY: WITHIN THE PAST 12 MONTHS, YOU WORRIED THAT YOUR FOOD WOULD RUN OUT BEFORE YOU GOT MONEY TO BUY MORE.: NEVER TRUE

## 2023-09-08 ENCOUNTER — OFFICE VISIT (OUTPATIENT)
Dept: OBGYN CLINIC | Age: 38
End: 2023-09-08
Payer: COMMERCIAL

## 2023-09-08 VITALS
HEIGHT: 62 IN | BODY MASS INDEX: 18.22 KG/M2 | WEIGHT: 99 LBS | SYSTOLIC BLOOD PRESSURE: 102 MMHG | DIASTOLIC BLOOD PRESSURE: 68 MMHG

## 2023-09-08 DIAGNOSIS — Z12.72 SCREENING FOR VAGINAL CANCER: ICD-10-CM

## 2023-09-08 DIAGNOSIS — Z80.3 FAMILY HISTORY OF BREAST CANCER: ICD-10-CM

## 2023-09-08 DIAGNOSIS — N64.4 MASTALGIA: ICD-10-CM

## 2023-09-08 DIAGNOSIS — Z01.419 WELL WOMAN EXAM WITH ROUTINE GYNECOLOGICAL EXAM: Primary | ICD-10-CM

## 2023-09-08 DIAGNOSIS — R68.82 DECREASED LIBIDO: ICD-10-CM

## 2023-09-08 PROCEDURE — 99395 PREV VISIT EST AGE 18-39: CPT | Performed by: OBSTETRICS & GYNECOLOGY

## 2023-09-08 NOTE — PROGRESS NOTES
POOJA Covarrubias is a 40 y.o. female seen for annual GYN exam.  Today she complains of severe breast tenderness and pain and also she complains of decreased libido. We discussed diet changes for her breast pain, vitamin E and we will try testosterone cream.  Testosterone cream may also help with her decreased libido. With her family history of breast cancer we will also order diagnostic mammogram.  No masses were noted on today's exam    Past Medical History, Past Surgical History, Family history, Social History, and Medications were all reviewed with the patient today and updated as necessary. Current Outpatient Medications   Medication Sig    UNABLE TO FIND Testosterone in versa base  4 mg/cc  Dispense 15 cc  Use 1/2 cc to the inner thigh daily     No current facility-administered medications for this visit.      Allergies   Allergen Reactions    Ciprofibrate Hives    Varenicline Other (See Comments) and Hallucinations     hallucinations     Past Medical History:   Diagnosis Date    Anxiety     Depression     Dysmenorrhea 10/4/2018    IC (interstitial cystitis)     Kidney stone     Menorrhagia 9/26/2019    Menorrhagia with irregular cycle 10/4/2018    Neuropathy     managed with medication    Other ill-defined conditions(799.89)     kidney stones ovarian cyst    Rectocele 7/31/2019    Restless leg syndrome     Severe dysmenorrhea 9/26/2019     Past Surgical History:   Procedure Laterality Date    FRAGMENT KIDNEY STONE/ ESWL      HYSTERECTOMY (CERVIX STATUS UNKNOWN)  09/29/2019    LAVH/BS-Ovaries intact    LAP,TUBAL CAUTERY      TUBAL LIGATION  2015    UROLOGICAL SURGERY      lithotripsy     Family History   Problem Relation Age of Onset    Osteoporosis Maternal Grandmother     Hypertension Maternal Grandmother     Diabetes Paternal Uncle     Cancer Mother         Breast and cervical    Breast Cancer Mother     Mental Illness Sister         Schizophrenia      Social History     Tobacco Use

## 2023-09-13 ENCOUNTER — PATIENT MESSAGE (OUTPATIENT)
Dept: OBGYN CLINIC | Age: 38
End: 2023-09-13

## 2023-09-14 NOTE — TELEPHONE ENCOUNTER
Dr. Sin Gordon, please review and advise. Patient having issues getting the testosterone cream and is asking if there is something else that can be prescribed.

## 2023-09-15 LAB
CYTOLOGIST CVX/VAG CYTO: NORMAL
CYTOLOGY CVX/VAG DOC THIN PREP: NORMAL
HPV APTIMA: NEGATIVE
Lab: NORMAL
PATH REPORT.FINAL DX SPEC: NORMAL
STAT OF ADQ CVX/VAG CYTO-IMP: NORMAL

## 2023-10-27 ENCOUNTER — HOSPITAL ENCOUNTER (OUTPATIENT)
Dept: MAMMOGRAPHY | Age: 38
End: 2023-10-27
Attending: OBSTETRICS & GYNECOLOGY
Payer: COMMERCIAL

## 2023-10-27 VITALS — WEIGHT: 100 LBS | HEIGHT: 62 IN | BODY MASS INDEX: 18.4 KG/M2

## 2023-10-27 DIAGNOSIS — Z80.3 FAMILY HISTORY OF BREAST CANCER: ICD-10-CM

## 2023-10-27 DIAGNOSIS — N64.4 MASTALGIA: ICD-10-CM

## 2023-10-27 PROCEDURE — G0279 TOMOSYNTHESIS, MAMMO: HCPCS

## 2023-10-27 PROCEDURE — 77066 DX MAMMO INCL CAD BI: CPT

## 2023-12-24 ENCOUNTER — PATIENT MESSAGE (OUTPATIENT)
Dept: UROLOGY | Age: 38
End: 2023-12-24

## 2023-12-24 DIAGNOSIS — N20.0 KIDNEY STONES: Primary | ICD-10-CM

## 2023-12-28 RX ORDER — HYDROCODONE BITARTRATE AND ACETAMINOPHEN 5; 325 MG/1; MG/1
1 TABLET ORAL EVERY 6 HOURS PRN
Qty: 12 TABLET | Refills: 0 | Status: SHIPPED | OUTPATIENT
Start: 2023-12-28 | End: 2023-12-31

## 2023-12-28 NOTE — TELEPHONE ENCOUNTER
From: Megan Rodriguez  To: Sharyle Cypher  Sent: 12/24/2023 5:58 PM EST  Subject: It's been a while thank carolyn Mccarthy Mrs Sharon Moreland Pau Gordillo. I know I'm shooting a long shot, but I have another stone. They never called me for a CT scan so I said forget it. I do have another stone on top of covid or flu. I'm being checked the [facility]th at [facility] just to be on the safe side, if I need any medicine as my primary dr is full and will be closed. I think by the time I have time to make an appointment with you, I will have passed it, at least I'm praying I do. If your not busy would you mind writing me for a few pain pills to help me through? With it being pau gordillo and Pau, if you can't it's understandable.

## 2024-07-21 ENCOUNTER — PATIENT MESSAGE (OUTPATIENT)
Dept: UROLOGY | Age: 39
End: 2024-07-21

## 2024-09-24 ENCOUNTER — OFFICE VISIT (OUTPATIENT)
Dept: OBGYN CLINIC | Age: 39
End: 2024-09-24
Payer: COMMERCIAL

## 2024-09-24 VITALS — WEIGHT: 107 LBS | DIASTOLIC BLOOD PRESSURE: 60 MMHG | BODY MASS INDEX: 19.57 KG/M2 | SYSTOLIC BLOOD PRESSURE: 100 MMHG

## 2024-09-24 DIAGNOSIS — Z12.31 ENCOUNTER FOR SCREENING MAMMOGRAM FOR MALIGNANT NEOPLASM OF BREAST: ICD-10-CM

## 2024-09-24 DIAGNOSIS — R10.2 PELVIC PAIN: Primary | ICD-10-CM

## 2024-09-24 DIAGNOSIS — F17.200 SMOKER: ICD-10-CM

## 2024-09-24 PROCEDURE — 99214 OFFICE O/P EST MOD 30 MIN: CPT | Performed by: OBSTETRICS & GYNECOLOGY

## 2024-09-24 RX ORDER — ROPINIROLE 1 MG/1
1 TABLET, FILM COATED ORAL NIGHTLY
COMMUNITY

## 2024-09-24 RX ORDER — CELECOXIB 200 MG/1
200 CAPSULE ORAL DAILY PRN
COMMUNITY
Start: 2024-08-26 | End: 2024-12-24

## 2024-09-24 RX ORDER — ZONISAMIDE 25 MG/1
25-50 CAPSULE ORAL 2 TIMES DAILY
COMMUNITY
Start: 2024-08-26 | End: 2024-12-24

## 2024-09-24 RX ORDER — CYCLOBENZAPRINE HCL 10 MG
10 TABLET ORAL 3 TIMES DAILY
COMMUNITY
Start: 2024-08-26 | End: 2025-02-22

## 2024-10-08 ENCOUNTER — PROCEDURE VISIT (OUTPATIENT)
Dept: OBGYN CLINIC | Age: 39
End: 2024-10-08
Payer: COMMERCIAL

## 2024-10-08 ENCOUNTER — OFFICE VISIT (OUTPATIENT)
Dept: OBGYN CLINIC | Age: 39
End: 2024-10-08
Payer: COMMERCIAL

## 2024-10-08 VITALS
HEIGHT: 62 IN | DIASTOLIC BLOOD PRESSURE: 82 MMHG | WEIGHT: 106 LBS | SYSTOLIC BLOOD PRESSURE: 122 MMHG | BODY MASS INDEX: 19.51 KG/M2

## 2024-10-08 DIAGNOSIS — R10.2 PELVIC PAIN: Primary | ICD-10-CM

## 2024-10-08 DIAGNOSIS — N83.201 CYSTS OF BOTH OVARIES: ICD-10-CM

## 2024-10-08 DIAGNOSIS — F17.200 SMOKING: Primary | ICD-10-CM

## 2024-10-08 DIAGNOSIS — N83.202 CYSTS OF BOTH OVARIES: ICD-10-CM

## 2024-10-08 PROCEDURE — 76830 TRANSVAGINAL US NON-OB: CPT | Performed by: OBSTETRICS & GYNECOLOGY

## 2024-10-08 PROCEDURE — 99214 OFFICE O/P EST MOD 30 MIN: CPT | Performed by: OBSTETRICS & GYNECOLOGY

## 2024-10-08 NOTE — PROGRESS NOTES
The patient is here for  problems including smoker     HISTORY:      No LMP recorded. Patient has had a hysterectomy.SEE BELOW      Current Outpatient Medications on File Prior to Visit   Medication Sig Dispense Refill    celecoxib (CELEBREX) 200 MG capsule Take 1 capsule by mouth daily as needed      cyclobenzaprine (FLEXERIL) 10 MG tablet Take 1 tablet by mouth 3 times daily      rOPINIRole (REQUIP) 1 MG tablet Take 1 tablet by mouth nightly      zonisamide (ZONEGRAN) 25 MG capsule Take 1-2 capsules by mouth 2 times daily      UNABLE TO FIND Testosterone in versa base  4 mg/cc  Dispense 15 cc  Use 1/2 cc to the inner thigh daily 15 mL 5     No current facility-administered medications on file prior to visit.   SEE LIST    ROS:      PHYSICAL EXAM:  Blood pressure 122/82, height 1.575 m (5' 2\"), weight 48.1 kg (106 lb).    The patient appears well, alert, oriented x 3, in no distress.  Lungs are clear. Heart RRR, no murmurs. Abdomen soft without tenderness, guarding, mass or organomegaly.  Pelvic: bg     ASSESSMENT:DIAGNOSIS DISCUSSED INCLUDING DIFFERENTIAL  Smoker discussed   PLAN:    No orders of the defined types were placed in this encounter.    Many questions answered history reviewed precharting done required 30 minute of time discussing a vaiety of problems  goals are set  follow up planned                                       ULTRASOUND       Has 2 ov bg   virginie mcneal

## (undated) DEVICE — CARDINAL HEALTH FLEXIBLE LIGHT HANDLE COVER: Brand: CARDINAL HEALTH

## (undated) DEVICE — SOLUTION IRRIG 3000ML H2O STRL BAG

## (undated) DEVICE — DRAPE SHT 3 QTR PROXIMA 53X77 --

## (undated) DEVICE — CONTAINER SPEC HISTOLOGY 900ML POLYPR

## (undated) DEVICE — YANKAUER,BULB TIP,W/O VENT,RIGID,STERILE: Brand: MEDLINE

## (undated) DEVICE — SYRINGE TB 1ML NDL 25GA L0.625IN PLAS SLIP TIP CONVENTIONAL

## (undated) DEVICE — SOLUTION IRRIGATION H2O 0797305] ICU MEDICAL INC]

## (undated) DEVICE — SUTURE VCRL SZ 2-0 L36IN ABSRB UD L36MM CT-1 1/2 CIR J945H

## (undated) DEVICE — SUTURE VCRL SZ 4-0 L27IN ABSRB UD L19MM PS-2 3/8 CIR PRIM J426H

## (undated) DEVICE — NEEDLE SPNL 22GA BLU QNCKE DISP

## (undated) DEVICE — SUTURE ABSORBABLE BRAIDED 0 CT-1 8X27 IN UD VICRYL JJ41G

## (undated) DEVICE — CURITY IDOFORM PACKING STRIP: Brand: CURITY

## (undated) DEVICE — [HIGH FLOW INSUFFLATOR,  DO NOT USE IF PACKAGE IS DAMAGED,  KEEP DRY,  KEEP AWAY FROM SUNLIGHT,  PROTECT FROM HEAT AND RADIOACTIVE SOURCES.]: Brand: PNEUMOSURE

## (undated) DEVICE — TUBING, SUCTION, 1/4" X 10', STRAIGHT: Brand: MEDLINE

## (undated) DEVICE — SINGLE BASIN: Brand: CARDINAL HEALTH

## (undated) DEVICE — 2, DISPOSABLE SUCTION/IRRIGATOR WITHOUT DISPOSABLE TIP: Brand: STRYKEFLOW

## (undated) DEVICE — BUTTON SWITCH PENCIL BLADE ELECTRODE, HOLSTER: Brand: EDGE

## (undated) DEVICE — TROCAR: Brand: KII® SLEEVE

## (undated) DEVICE — DRAPE, LAVH, STERILE: Brand: MEDLINE

## (undated) DEVICE — SEALER ENDOSCP L37CM NANO COAT BLNT TIP LAP DIV

## (undated) DEVICE — TRAY CATH 16F DRN BG LTX -- CONVERT TO ITEM 363158

## (undated) DEVICE — INSUFFLATION NEEDLE TO ESTABLISH PNEUMOPERITONEUM.: Brand: INSUFFLATION NEEDLE

## (undated) DEVICE — TROCAR: Brand: KII SHIELDED BLADED ACCESS SYSTEM

## (undated) DEVICE — SUT CHRMC 0 27IN CT1 BRN --

## (undated) DEVICE — 40585 XL ADVANCED TRENDELENBURG POSITIONING KIT: Brand: 40585 XL ADVANCED TRENDELENBURG POSITIONING KIT

## (undated) DEVICE — Device

## (undated) DEVICE — TROCAR: Brand: KII FIOS FIRST ENTRY

## (undated) DEVICE — APPLICATOR SURG XL L38CM FOR ARISTA ABSRB HEMSTAT FLEXITIP

## (undated) DEVICE — SOLUTION IV 250ML 0.9% SOD CHL CLR INJ FLX BG CONT PRT CLSR

## (undated) DEVICE — TRAY PREP DRY W/ PREM GLV 2 APPL 6 SPNG 2 UNDPD 1 OVERWRAP

## (undated) DEVICE — GARMENT,MEDLINE,DVT,INT,CALF,MED, GEN2: Brand: MEDLINE

## (undated) DEVICE — SUTURE VCRL SZ 3-0 L36IN ABSRB UD L36MM CT-1 1/2 CIR J944H

## (undated) DEVICE — 2000CC GUARDIAN II: Brand: GUARDIAN

## (undated) DEVICE — APPLICATOR BNDG 1MM ADH PREMIERPRO EXOFIN

## (undated) DEVICE — SOLUTION IV 1000ML 0.9% SOD CHL

## (undated) DEVICE — (D)PREP SKN CHLRAPRP APPL 26ML -- CONVERT TO ITEM 371833

## (undated) DEVICE — DRAPE TWL SURG 16X26IN BLU ORB04] ALLCARE INC]

## (undated) DEVICE — REM POLYHESIVE ADULT PATIENT RETURN ELECTRODE: Brand: VALLEYLAB

## (undated) DEVICE — SOL ANTI-FOG 6ML MEDC -- MEDICHOICE - CONVERT TO 358427

## (undated) DEVICE — AGENT HEMSTAT 5GM ARISTA AH